# Patient Record
Sex: MALE | Race: WHITE | NOT HISPANIC OR LATINO | Employment: OTHER | URBAN - METROPOLITAN AREA
[De-identification: names, ages, dates, MRNs, and addresses within clinical notes are randomized per-mention and may not be internally consistent; named-entity substitution may affect disease eponyms.]

---

## 2023-09-18 ENCOUNTER — HOSPITAL ENCOUNTER (OUTPATIENT)
Dept: DATA CONVERSION | Facility: HOSPITAL | Age: 63
Discharge: HOME | End: 2023-09-18
Payer: MEDICARE

## 2023-09-18 DIAGNOSIS — Z01.89 ENCOUNTER FOR OTHER SPECIFIED SPECIAL EXAMINATIONS: ICD-10-CM

## 2023-09-18 LAB
ALBUMIN SERPL-MCNC: 4.5 GM/DL (ref 3.5–5)
ALBUMIN/GLOB SERPL: 2 RATIO (ref 1.5–3)
ALP BLD-CCNC: 67 U/L (ref 35–125)
ALT SERPL-CCNC: 43 U/L (ref 5–40)
ANION GAP SERPL CALCULATED.3IONS-SCNC: 15 MMOL/L (ref 0–19)
AST SERPL-CCNC: 40 U/L (ref 5–40)
BASOPHILS # BLD AUTO: 0.05 K/UL (ref 0–0.22)
BASOPHILS NFR BLD AUTO: 0.6 % (ref 0–1)
BILIRUB DIRECT SERPL-MCNC: 0.2 MG/DL (ref 0–0.2)
BILIRUB INDIRECT SERPL-MCNC: 0 MG/DL (ref 0–0.8)
BILIRUB SERPL-MCNC: 0.2 MG/DL (ref 0.1–1.2)
BUN SERPL-MCNC: 10 MG/DL (ref 8–25)
BUN/CREAT SERPL: 11.1 RATIO (ref 8–21)
CALCIUM SERPL-MCNC: 9.4 MG/DL (ref 8.5–10.4)
CHLORIDE SERPL-SCNC: 106 MMOL/L (ref 97–107)
CO2 SERPL-SCNC: 23 MMOL/L (ref 24–31)
CREAT SERPL-MCNC: 0.9 MG/DL (ref 0.4–1.6)
DEPRECATED RDW RBC AUTO: 43.7 FL (ref 37–54)
DIFFERENTIAL METHOD BLD: ABNORMAL
EOSINOPHIL # BLD AUTO: 0.06 K/UL (ref 0–0.45)
EOSINOPHIL NFR BLD: 0.7 % (ref 0–3)
ERYTHROCYTE [DISTWIDTH] IN BLOOD BY AUTOMATED COUNT: 12.7 % (ref 11.7–15)
GFR SERPL CREATININE-BSD FRML MDRD: 96 ML/MIN/1.73 M2
GLOBULIN SER-MCNC: 2.2 G/DL (ref 1.9–3.7)
GLUCOSE SERPL-MCNC: 101 MG/DL (ref 65–99)
HCT VFR BLD AUTO: 42.3 % (ref 41–50)
HGB BLD-MCNC: 14.4 GM/DL (ref 13.5–16.5)
IMM GRANULOCYTES # BLD AUTO: 0.02 K/UL (ref 0–0.1)
LYMPHOCYTES # BLD AUTO: 2.51 K/UL (ref 1.2–3.2)
LYMPHOCYTES NFR BLD MANUAL: 29.2 % (ref 20–40)
MCH RBC QN AUTO: 31.9 PG (ref 26–34)
MCHC RBC AUTO-ENTMCNC: 34 % (ref 31–37)
MCV RBC AUTO: 93.8 FL (ref 80–100)
MONOCYTES # BLD AUTO: 0.74 K/UL (ref 0–0.8)
MONOCYTES NFR BLD MANUAL: 8.6 % (ref 0–8)
NEUTROPHILS # BLD AUTO: 5.21 K/UL
NEUTROPHILS # BLD AUTO: 5.21 K/UL (ref 1.8–7.7)
NEUTROPHILS.IMMATURE NFR BLD: 0.2 % (ref 0–1)
NEUTS SEG NFR BLD: 60.7 % (ref 50–70)
NRBC BLD-RTO: 0 /100 WBC
PLATELET # BLD AUTO: 254 K/UL (ref 150–450)
PMV BLD AUTO: 10.4 CU (ref 7–12.6)
POTASSIUM SERPL-SCNC: 4.3 MMOL/L (ref 3.4–5.1)
PROT SERPL-MCNC: 6.7 G/DL (ref 5.9–7.9)
RBC # BLD AUTO: 4.51 M/UL (ref 4.5–5.5)
SODIUM SERPL-SCNC: 144 MMOL/L (ref 133–145)
WBC # BLD AUTO: 8.6 K/UL (ref 4.5–11)

## 2024-02-16 ENCOUNTER — TELEPHONE (OUTPATIENT)
Dept: PRIMARY CARE | Facility: CLINIC | Age: 64
End: 2024-02-16
Payer: MEDICARE

## 2024-02-16 DIAGNOSIS — Z87.39 HISTORY OF GOUT: Primary | ICD-10-CM

## 2024-02-17 PROBLEM — K57.90 DIVERTICULOSIS: Status: ACTIVE | Noted: 2024-02-17

## 2024-02-17 PROBLEM — Z87.39 HISTORY OF GOUT: Status: ACTIVE | Noted: 2024-02-17

## 2024-02-17 PROBLEM — M15.0 PRIMARY OSTEOARTHRITIS INVOLVING MULTIPLE JOINTS: Status: ACTIVE | Noted: 2024-02-17

## 2024-02-17 PROBLEM — I49.1 PAC (PREMATURE ATRIAL CONTRACTION): Status: ACTIVE | Noted: 2024-02-17

## 2024-02-17 PROBLEM — I10 ESSENTIAL HYPERTENSION: Status: ACTIVE | Noted: 2024-02-17

## 2024-02-17 PROBLEM — F10.10 ALCOHOL ABUSE: Status: ACTIVE | Noted: 2024-02-17

## 2024-02-17 PROBLEM — M15.9 PRIMARY OSTEOARTHRITIS INVOLVING MULTIPLE JOINTS: Status: ACTIVE | Noted: 2024-02-17

## 2024-02-17 PROBLEM — E78.2 MIXED HYPERLIPIDEMIA: Status: ACTIVE | Noted: 2024-02-17

## 2024-02-17 RX ORDER — METHYLPREDNISOLONE 4 MG/1
TABLET ORAL
Qty: 21 TABLET | Refills: 0 | Status: SHIPPED | OUTPATIENT
Start: 2024-02-17 | End: 2024-02-24

## 2024-02-17 NOTE — TELEPHONE ENCOUNTER
Prescription sent. Please  the patient that, assuming it is gout, this would be secondary to his alcohol intake and he should consider cutting back.

## 2024-03-20 ENCOUNTER — LAB (OUTPATIENT)
Dept: LAB | Facility: LAB | Age: 64
End: 2024-03-20
Payer: MEDICARE

## 2024-03-20 DIAGNOSIS — E55.9 VITAMIN D DEFICIENCY, UNSPECIFIED: ICD-10-CM

## 2024-03-20 DIAGNOSIS — Z87.39 PERSONAL HISTORY OF OTHER DISEASES OF THE MUSCULOSKELETAL SYSTEM AND CONNECTIVE TISSUE: ICD-10-CM

## 2024-03-20 DIAGNOSIS — Z12.5 ENCOUNTER FOR SCREENING FOR MALIGNANT NEOPLASM OF PROSTATE: ICD-10-CM

## 2024-03-20 DIAGNOSIS — E78.2 MIXED HYPERLIPIDEMIA: ICD-10-CM

## 2024-03-20 DIAGNOSIS — Z01.89 ENCOUNTER FOR OTHER SPECIFIED SPECIAL EXAMINATIONS: Primary | ICD-10-CM

## 2024-03-20 LAB
25(OH)D3 SERPL-MCNC: 28 NG/ML (ref 31–100)
ALBUMIN SERPL-MCNC: 4.4 G/DL (ref 3.5–5)
ALP BLD-CCNC: 66 U/L (ref 35–125)
ALT SERPL-CCNC: 35 U/L (ref 5–40)
ANION GAP SERPL CALC-SCNC: 12 MMOL/L
AST SERPL-CCNC: 25 U/L (ref 5–40)
BASOPHILS # BLD AUTO: 0.05 X10*3/UL (ref 0–0.1)
BASOPHILS NFR BLD AUTO: 0.7 %
BILIRUB DIRECT SERPL-MCNC: <0.2 MG/DL (ref 0–0.2)
BILIRUB SERPL-MCNC: 0.4 MG/DL (ref 0.1–1.2)
BUN SERPL-MCNC: 7 MG/DL (ref 8–25)
CALCIUM SERPL-MCNC: 9.1 MG/DL (ref 8.5–10.4)
CHLORIDE SERPL-SCNC: 105 MMOL/L (ref 97–107)
CHOLEST SERPL-MCNC: 243 MG/DL (ref 133–200)
CHOLEST/HDLC SERPL: 3.2 {RATIO}
CO2 SERPL-SCNC: 24 MMOL/L (ref 24–31)
CREAT SERPL-MCNC: 0.8 MG/DL (ref 0.4–1.6)
EGFRCR SERPLBLD CKD-EPI 2021: >90 ML/MIN/1.73M*2
EOSINOPHIL # BLD AUTO: 0.21 X10*3/UL (ref 0–0.7)
EOSINOPHIL NFR BLD AUTO: 2.9 %
ERYTHROCYTE [DISTWIDTH] IN BLOOD BY AUTOMATED COUNT: 13.2 % (ref 11.5–14.5)
EST. AVERAGE GLUCOSE BLD GHB EST-MCNC: 111 MG/DL
GLUCOSE SERPL-MCNC: 110 MG/DL (ref 65–99)
HBA1C MFR BLD: 5.5 %
HCT VFR BLD AUTO: 43.8 % (ref 41–52)
HDLC SERPL-MCNC: 77 MG/DL
HGB BLD-MCNC: 14.7 G/DL (ref 13.5–17.5)
IMM GRANULOCYTES # BLD AUTO: 0.02 X10*3/UL (ref 0–0.7)
IMM GRANULOCYTES NFR BLD AUTO: 0.3 % (ref 0–0.9)
LDLC SERPL CALC-MCNC: 135 MG/DL (ref 65–130)
LYMPHOCYTES # BLD AUTO: 2.8 X10*3/UL (ref 1.2–4.8)
LYMPHOCYTES NFR BLD AUTO: 38.9 %
MCH RBC QN AUTO: 31.8 PG (ref 26–34)
MCHC RBC AUTO-ENTMCNC: 33.6 G/DL (ref 32–36)
MCV RBC AUTO: 95 FL (ref 80–100)
MONOCYTES # BLD AUTO: 0.56 X10*3/UL (ref 0.1–1)
MONOCYTES NFR BLD AUTO: 7.8 %
NEUTROPHILS # BLD AUTO: 3.55 X10*3/UL (ref 1.2–7.7)
NEUTROPHILS NFR BLD AUTO: 49.4 %
NRBC BLD-RTO: 0 /100 WBCS (ref 0–0)
PLATELET # BLD AUTO: 239 X10*3/UL (ref 150–450)
POTASSIUM SERPL-SCNC: 4.4 MMOL/L (ref 3.4–5.1)
PROT SERPL-MCNC: 6.5 G/DL (ref 5.9–7.9)
PSA SERPL-MCNC: 0.4 NG/ML
RBC # BLD AUTO: 4.62 X10*6/UL (ref 4.5–5.9)
SODIUM SERPL-SCNC: 141 MMOL/L (ref 133–145)
TRIGL SERPL-MCNC: 154 MG/DL (ref 40–150)
URATE SERPL-MCNC: 7.5 MG/DL (ref 3.6–7.7)
WBC # BLD AUTO: 7.2 X10*3/UL (ref 4.4–11.3)

## 2024-03-20 PROCEDURE — 82248 BILIRUBIN DIRECT: CPT

## 2024-03-20 PROCEDURE — 80053 COMPREHEN METABOLIC PANEL: CPT

## 2024-03-20 PROCEDURE — 80061 LIPID PANEL: CPT

## 2024-03-20 PROCEDURE — 84153 ASSAY OF PSA TOTAL: CPT

## 2024-03-20 PROCEDURE — 84550 ASSAY OF BLOOD/URIC ACID: CPT

## 2024-03-20 PROCEDURE — 82306 VITAMIN D 25 HYDROXY: CPT

## 2024-03-20 PROCEDURE — 83036 HEMOGLOBIN GLYCOSYLATED A1C: CPT

## 2024-03-20 PROCEDURE — 36415 COLL VENOUS BLD VENIPUNCTURE: CPT

## 2024-03-20 PROCEDURE — 85025 COMPLETE CBC W/AUTO DIFF WBC: CPT

## 2024-03-21 PROBLEM — M54.16 LUMBAR RADICULOPATHY: Status: ACTIVE | Noted: 2024-03-21

## 2024-03-21 PROBLEM — E55.9 VITAMIN D DEFICIENCY, UNSPECIFIED: Status: ACTIVE | Noted: 2023-03-20

## 2024-03-21 PROBLEM — Z12.5 ENCOUNTER FOR SCREENING FOR MALIGNANT NEOPLASM OF PROSTATE: Status: RESOLVED | Noted: 2023-03-20 | Resolved: 2024-03-21

## 2024-03-21 PROBLEM — M15.9 GENERALIZED OSTEOARTHRITIS: Status: ACTIVE | Noted: 2024-03-21

## 2024-03-21 PROBLEM — R78.2: Status: ACTIVE | Noted: 2023-03-20

## 2024-03-21 PROBLEM — Z87.19 PERSONAL HISTORY OF OTHER DISEASES OF THE DIGESTIVE SYSTEM: Status: RESOLVED | Noted: 2024-03-21 | Resolved: 2024-03-21

## 2024-03-21 PROBLEM — Z87.39 PERSONAL HISTORY OF OTHER DISEASES OF THE MUSCULOSKELETAL SYSTEM AND CONNECTIVE TISSUE: Status: RESOLVED | Noted: 2023-03-20 | Resolved: 2024-03-21

## 2024-03-21 PROBLEM — R73.9 HYPERGLYCEMIA, UNSPECIFIED: Status: ACTIVE | Noted: 2023-03-20

## 2024-03-21 PROBLEM — N52.9 MALE ERECTILE DYSFUNCTION, UNSPECIFIED: Status: ACTIVE | Noted: 2024-03-21

## 2024-03-21 PROBLEM — E78.1 HYPERTRIGLYCERIDEMIA: Status: ACTIVE | Noted: 2024-03-21

## 2024-03-21 PROBLEM — E03.9 HYPOTHYROIDISM, UNSPECIFIED: Status: ACTIVE | Noted: 2023-03-20

## 2024-03-21 RX ORDER — BENAZEPRIL HYDROCHLORIDE 40 MG/1
TABLET ORAL EVERY 24 HOURS
COMMUNITY
End: 2024-03-26 | Stop reason: SDUPTHER

## 2024-03-21 RX ORDER — AMLODIPINE BESYLATE 10 MG/1
TABLET ORAL EVERY 24 HOURS
COMMUNITY
End: 2024-03-26 | Stop reason: SDUPTHER

## 2024-03-21 RX ORDER — DEXTROMETHORPHAN HYDROBROMIDE, GUAIFENESIN 5; 100 MG/5ML; MG/5ML
LIQUID ORAL EVERY 8 HOURS
COMMUNITY
End: 2024-03-26 | Stop reason: ALTCHOICE

## 2024-03-26 ENCOUNTER — OFFICE VISIT (OUTPATIENT)
Dept: PRIMARY CARE | Facility: CLINIC | Age: 64
End: 2024-03-26
Payer: MEDICARE

## 2024-03-26 VITALS
HEIGHT: 69 IN | BODY MASS INDEX: 28.58 KG/M2 | OXYGEN SATURATION: 97 % | TEMPERATURE: 97.9 F | SYSTOLIC BLOOD PRESSURE: 122 MMHG | WEIGHT: 193 LBS | DIASTOLIC BLOOD PRESSURE: 82 MMHG | HEART RATE: 83 BPM

## 2024-03-26 DIAGNOSIS — E55.9 VITAMIN D DEFICIENCY: ICD-10-CM

## 2024-03-26 DIAGNOSIS — Z87.39 HISTORY OF GOUT: ICD-10-CM

## 2024-03-26 DIAGNOSIS — Z01.89 ENCOUNTER FOR ROUTINE LABORATORY TESTING: ICD-10-CM

## 2024-03-26 DIAGNOSIS — E78.2 MIXED HYPERLIPIDEMIA: ICD-10-CM

## 2024-03-26 DIAGNOSIS — M15.9 PRIMARY OSTEOARTHRITIS INVOLVING MULTIPLE JOINTS: Primary | ICD-10-CM

## 2024-03-26 DIAGNOSIS — I10 PRIMARY HYPERTENSION: ICD-10-CM

## 2024-03-26 PROBLEM — M19.90 OA (OSTEOARTHRITIS): Status: ACTIVE | Noted: 2024-02-17

## 2024-03-26 PROBLEM — E78.1 HYPERTRIGLYCERIDEMIA: Status: RESOLVED | Noted: 2024-03-21 | Resolved: 2024-03-26

## 2024-03-26 PROBLEM — E78.5 HLD (HYPERLIPIDEMIA): Status: ACTIVE | Noted: 2024-02-17

## 2024-03-26 PROCEDURE — 1036F TOBACCO NON-USER: CPT | Performed by: STUDENT IN AN ORGANIZED HEALTH CARE EDUCATION/TRAINING PROGRAM

## 2024-03-26 PROCEDURE — 3079F DIAST BP 80-89 MM HG: CPT | Performed by: STUDENT IN AN ORGANIZED HEALTH CARE EDUCATION/TRAINING PROGRAM

## 2024-03-26 PROCEDURE — 99214 OFFICE O/P EST MOD 30 MIN: CPT | Performed by: STUDENT IN AN ORGANIZED HEALTH CARE EDUCATION/TRAINING PROGRAM

## 2024-03-26 PROCEDURE — 3074F SYST BP LT 130 MM HG: CPT | Performed by: STUDENT IN AN ORGANIZED HEALTH CARE EDUCATION/TRAINING PROGRAM

## 2024-03-26 RX ORDER — ACETAMINOPHEN 500 MG
2000 TABLET ORAL DAILY
Start: 2024-03-26

## 2024-03-26 RX ORDER — AMLODIPINE BESYLATE 10 MG/1
10 TABLET ORAL DAILY
Qty: 90 TABLET | Refills: 3 | Status: SHIPPED | OUTPATIENT
Start: 2024-03-26 | End: 2025-03-26

## 2024-03-26 RX ORDER — BENAZEPRIL HYDROCHLORIDE 40 MG/1
40 TABLET ORAL DAILY
Qty: 90 TABLET | Refills: 3 | Status: SHIPPED | OUTPATIENT
Start: 2024-03-26 | End: 2025-03-26

## 2024-03-26 RX ORDER — ALLOPURINOL 100 MG/1
100 TABLET ORAL DAILY
Qty: 90 TABLET | Refills: 3 | Status: SHIPPED | OUTPATIENT
Start: 2024-03-26 | End: 2025-03-26

## 2024-03-26 RX ORDER — MULTIVITAMIN
1 TABLET ORAL DAILY
Start: 2024-03-26

## 2024-03-26 ASSESSMENT — PATIENT HEALTH QUESTIONNAIRE - PHQ9
1. LITTLE INTEREST OR PLEASURE IN DOING THINGS: NOT AT ALL
2. FEELING DOWN, DEPRESSED OR HOPELESS: NOT AT ALL
SUM OF ALL RESPONSES TO PHQ9 QUESTIONS 1 AND 2: 0

## 2024-03-26 ASSESSMENT — ENCOUNTER SYMPTOMS
CARDIOVASCULAR NEGATIVE: 1
RESPIRATORY NEGATIVE: 1
GASTROINTESTINAL NEGATIVE: 1
CONSTITUTIONAL NEGATIVE: 1

## 2024-03-26 ASSESSMENT — PAIN SCALES - GENERAL: PAINLEVEL: 0-NO PAIN

## 2024-03-26 NOTE — PROGRESS NOTES
[facility]: [facility]  PROGRESS NOTE      Austen Eagle is a 63 y.o. male that is presenting today for Follow-up.    Assessment/Plan   Diagnoses and all orders for this visit:  Primary osteoarthritis involving multiple joints  Mixed hyperlipidemia  -     Hepatic Function Panel; Future  Primary hypertension  -     amLODIPine (Norvasc) 10 mg tablet; Take 1 tablet (10 mg) by mouth once daily.  -     benazepril (Lotensin) 40 mg tablet; Take 1 tablet (40 mg) by mouth once daily.  -     CBC and Auto Differential; Future  -     Basic Metabolic Panel; Future  Vitamin D deficiency  -     multivitamin tablet; Take 1 tablet by mouth once daily.  -     cholecalciferol (Vitamin D3) 50 mcg (2,000 unit) capsule; Take 1 capsule (50 mcg) by mouth once daily.  -     Vitamin D 25-Hydroxy,Total (for eval of Vitamin D levels); Future  History of gout  -     multivitamin tablet; Take 1 tablet by mouth once daily.  -     allopurinol (Zyloprim) 100 mg tablet; Take 1 tablet (100 mg) by mouth once daily.  -     Uric Acid; Future  Encounter for routine laboratory testing  -     CBC and Auto Differential; Future  -     Basic Metabolic Panel; Future  -     Hepatic Function Panel; Future  -     Uric Acid; Future  -     Vitamin D 25-Hydroxy,Total (for eval of Vitamin D levels); Future  -     Follow Up In Primary Care; Future    - Significant medication and problem list reconciliation done today.  - Patient notes that he had a recent gout flare. Discussed with him modifiable risk factors that can play a role. Patient voiced understanding. Uric acid higher than target on labs. Start low-dose allopurinol. Recheck in six months.  - Patient's vitals look good. Do not need to make any changes at this time.  - Patient had labwork done for this appointment. Discussed today, outside of the uric acid being somewhat elevated, labwork fairly unremarkable. Will order labwork for the patient's next appointment.  - Encouraged continued  diet and exercise modification.    Subjective   - The patient otherwise feels well and denies any acute symptoms or concerns at this time.  - The patient denies any changes or progression of their chronic medical problems.  - The patient denies any problems or concerns with their medications.      Review of Systems   Constitutional: Negative.    Respiratory: Negative.     Cardiovascular: Negative.    Gastrointestinal: Negative.    All other systems reviewed and are negative.     Objective   Vitals:    03/26/24 1045   BP: 122/82   Pulse: 83   Temp: 36.6 °C (97.9 °F)   SpO2: 97%      Body mass index is 28.5 kg/m².  Physical Exam  Vitals and nursing note reviewed.   Constitutional:       General: He is not in acute distress.  Neck:      Vascular: No carotid bruit.   Cardiovascular:      Rate and Rhythm: Normal rate and regular rhythm.      Heart sounds: Normal heart sounds.   Pulmonary:      Effort: Pulmonary effort is normal.      Breath sounds: Normal breath sounds.   Musculoskeletal:         General: No swelling.   Neurological:      Mental Status: He is alert. Mental status is at baseline.   Psychiatric:         Mood and Affect: Mood normal.       Diagnostic Results   Lab Results   Component Value Date    GLUCOSE 110 (H) 03/20/2024    CALCIUM 9.1 03/20/2024     03/20/2024    K 4.4 03/20/2024    CO2 24 03/20/2024     03/20/2024    BUN 7 (L) 03/20/2024    CREATININE 0.80 03/20/2024     Lab Results   Component Value Date    ALT 35 03/20/2024    AST 25 03/20/2024    ALKPHOS 66 03/20/2024    BILITOT 0.4 03/20/2024     Lab Results   Component Value Date    WBC 7.2 03/20/2024    HGB 14.7 03/20/2024    HCT 43.8 03/20/2024    MCV 95 03/20/2024     03/20/2024     Lab Results   Component Value Date    CHOL 243 (H) 03/20/2024    CHOL 223 (H) 03/20/2023    CHOL 259 (H) 02/22/2021     Lab Results   Component Value Date    HDL 77.0 03/20/2024    HDL 77 03/20/2023    HDL 84 02/22/2021     Lab Results   Component  "Value Date    LDLCALC 135 (H) 03/20/2024    LDLCALC 123 03/20/2023    LDLCALC 125 02/22/2021     Lab Results   Component Value Date    TRIG 154 (H) 03/20/2024    TRIG 114 03/20/2023    TRIG 249 (H) 02/22/2021     No components found for: \"CHOLHDL\"  Lab Results   Component Value Date    HGBA1C 5.5 03/20/2024     Other labs not included in the list above were reviewed either before or during this encounter.    History    Past Medical History:   Diagnosis Date    Personal history of other diseases of the digestive system 03/21/2024    Personal history of other diseases of the musculoskeletal system and connective tissue 03/20/2023     History reviewed. No pertinent surgical history.  No family history on file.  Social History     Socioeconomic History    Marital status:      Spouse name: Not on file    Number of children: Not on file    Years of education: Not on file    Highest education level: Not on file   Occupational History    Not on file   Tobacco Use    Smoking status: Former     Types: Cigarettes    Smokeless tobacco: Never   Vaping Use    Vaping Use: Never used   Substance and Sexual Activity    Alcohol use: Yes     Alcohol/week: 24.0 standard drinks of alcohol     Types: 24 Standard drinks or equivalent per week    Drug use: Yes     Types: Marijuana     Comment: sometimes    Sexual activity: Not on file   Other Topics Concern    Not on file   Social History Narrative    Not on file     Social Determinants of Health     Financial Resource Strain: Not on file   Food Insecurity: Not on file   Transportation Needs: Not on file   Physical Activity: Not on file   Stress: Not on file   Social Connections: Not on file   Intimate Partner Violence: Not on file   Housing Stability: Not on file     Allergies   Allergen Reactions    Hay Fever And Allergy Relief Other    Ibuprofen Unknown    Pollen Extracts Other    Aspirin Rash     Current Outpatient Medications on File Prior to Visit   Medication Sig Dispense " Refill    psyllium (Metamucil) 3.4 gram packet once every 24 hours.      vit A/vit C/vit E/zinc/copper (ICAPS AREDS ORAL) once every 24 hours.      [DISCONTINUED] acetaminophen (Tylenol 8 HOUR) 650 mg ER tablet Take by mouth every 8 hours.      [DISCONTINUED] amLODIPine (Norvasc) 10 mg tablet Take by mouth once every 24 hours.      [DISCONTINUED] benazepril (Lotensin) 40 mg tablet Take by mouth once every 24 hours.      [DISCONTINUED] MULTIVITAMIN ORAL as directed Orally      [DISCONTINUED] NON FORMULARY Gout OTC       No current facility-administered medications on file prior to visit.       There is no immunization history on file for this patient.  Patient's medical history was reviewed and updated either before or during this encounter.       Dorian Mckeon MD

## 2024-03-26 NOTE — PATIENT INSTRUCTIONS
Diagnoses and all orders for this visit:  Primary osteoarthritis involving multiple joints  Mixed hyperlipidemia  -     Hepatic Function Panel; Future  Primary hypertension  -     amLODIPine (Norvasc) 10 mg tablet; Take 1 tablet (10 mg) by mouth once daily.  -     benazepril (Lotensin) 40 mg tablet; Take 1 tablet (40 mg) by mouth once daily.  -     CBC and Auto Differential; Future  -     Basic Metabolic Panel; Future  Vitamin D deficiency  -     multivitamin tablet; Take 1 tablet by mouth once daily.  -     cholecalciferol (Vitamin D3) 50 mcg (2,000 unit) capsule; Take 1 capsule (50 mcg) by mouth once daily.  -     Vitamin D 25-Hydroxy,Total (for eval of Vitamin D levels); Future  History of gout  -     multivitamin tablet; Take 1 tablet by mouth once daily.  -     allopurinol (Zyloprim) 100 mg tablet; Take 1 tablet (100 mg) by mouth once daily.  -     Uric Acid; Future  Encounter for routine laboratory testing  -     CBC and Auto Differential; Future  -     Basic Metabolic Panel; Future  -     Hepatic Function Panel; Future  -     Uric Acid; Future  -     Vitamin D 25-Hydroxy,Total (for eval of Vitamin D levels); Future  -     Follow Up In Primary Care; Future     - Significant medication and problem list reconciliation done today.  - Patient notes that he had a recent gout flare. Discussed with him modifiable risk factors that can play a role. Patient voiced understanding. Uric acid higher than target on labs. Start low-dose allopurinol. Recheck in six months.  - Patient's vitals look good. Do not need to make any changes at this time.  - Patient had labwork done for this appointment. Discussed today, outside of the uric acid being somewhat elevated, labwork fairly unremarkable. Will order labwork for the patient's next appointment.  - Encouraged continued diet and exercise modification.

## 2024-04-10 ENCOUNTER — HOSPITAL ENCOUNTER (OUTPATIENT)
Dept: RADIOLOGY | Facility: EXTERNAL LOCATION | Age: 64
Discharge: HOME | End: 2024-04-10

## 2024-04-10 DIAGNOSIS — M25.521 RIGHT ELBOW PAIN: ICD-10-CM

## 2024-04-11 ENCOUNTER — OFFICE VISIT (OUTPATIENT)
Dept: ORTHOPEDIC SURGERY | Facility: CLINIC | Age: 64
End: 2024-04-11
Payer: MEDICARE

## 2024-04-11 DIAGNOSIS — S50.11XA CONTUSION OF ELBOW AND FOREARM, RIGHT, INITIAL ENCOUNTER: Primary | ICD-10-CM

## 2024-04-11 PROCEDURE — 1036F TOBACCO NON-USER: CPT | Performed by: ORTHOPAEDIC SURGERY

## 2024-04-11 PROCEDURE — 99213 OFFICE O/P EST LOW 20 MIN: CPT | Performed by: ORTHOPAEDIC SURGERY

## 2024-04-11 PROCEDURE — 99203 OFFICE O/P NEW LOW 30 MIN: CPT | Performed by: ORTHOPAEDIC SURGERY

## 2024-04-11 RX ORDER — TRAMADOL HYDROCHLORIDE 50 MG/1
50 TABLET ORAL EVERY 8 HOURS PRN
Qty: 6 TABLET | Refills: 0 | Status: SHIPPED | OUTPATIENT
Start: 2024-04-11 | End: 2024-04-16

## 2024-04-11 ASSESSMENT — PATIENT HEALTH QUESTIONNAIRE - PHQ9
SUM OF ALL RESPONSES TO PHQ9 QUESTIONS 1 AND 2: 0
1. LITTLE INTEREST OR PLEASURE IN DOING THINGS: NOT AT ALL
2. FEELING DOWN, DEPRESSED OR HOPELESS: NOT AT ALL

## 2024-04-11 ASSESSMENT — PAIN SCALES - GENERAL: PAINLEVEL_OUTOF10: 10 - WORST POSSIBLE PAIN

## 2024-04-11 ASSESSMENT — ENCOUNTER SYMPTOMS
LOSS OF SENSATION IN FEET: 0
OCCASIONAL FEELINGS OF UNSTEADINESS: 0
DEPRESSION: 0

## 2024-04-11 ASSESSMENT — PAIN - FUNCTIONAL ASSESSMENT: PAIN_FUNCTIONAL_ASSESSMENT: 0-10

## 2024-04-11 NOTE — PROGRESS NOTES
History of Present Illness:  Chief Complaint   Patient presents with    Right Elbow - Injury       Patient presents today for evaluation of right elbow injury that occurred a couple days ago when he fell off a ladder from approximately 8 feet up.  He fell and sustained multiple bruises, but right elbow has been most symptomatic.  He was seen in urgent care where radiographs were obtained and there was some concern for possible occult elbow injury.  Placed into sling and referred for further treatment.  Patient reports continued aching/throbbing pains into his elbow.  He does also have aching pains into other areas of bruising, but no specific activity limitations with other areas of his body.    Past Medical History:   Diagnosis Date    Personal history of other diseases of the digestive system 03/21/2024    Personal history of other diseases of the musculoskeletal system and connective tissue 03/20/2023       Medication Documentation Review Audit       Reviewed by Deon Interiano MD (Physician) on 04/11/24 at 0904      Medication Order Taking? Sig Documenting Provider Last Dose Status   allopurinol (Zyloprim) 100 mg tablet [id number]  Take 1 tablet (100 mg) by mouth once daily. Dorian Mckeon MD  Active   amLODIPine (Norvasc) 10 mg tablet [id number]  Take 1 tablet (10 mg) by mouth once daily. Dorian Mckeon MD  Active   benazepril (Lotensin) 40 mg tablet [id number]  Take 1 tablet (40 mg) by mouth once daily. Dorian Mckeon MD  Active   cholecalciferol (Vitamin D3) 50 mcg (2,000 unit) capsule [id number]  Take 1 capsule (50 mcg) by mouth once daily. Dorian Mckeon MD  Active   multivitamin tablet [id number]  Take 1 tablet by mouth once daily. Dorian Mckeon MD  Active   psyllium (Metamucil) 3.4 gram packet [id number] No once every 24 hours. Historical Provider, MD Taking Active   vit A/vit C/vit E/zinc/copper (ICAPS AREDS ORAL) [id number] No once every 24 hours. Historical Provider, MD Taking Active                     Allergies   Allergen Reactions    Hay Fever And Allergy Relief Other    Ibuprofen Unknown    Pollen Extracts Other    Aspirin Rash       Social History     Socioeconomic History    Marital status:      Spouse name: Not on file    Number of children: Not on file    Years of education: Not on file    Highest education level: Not on file   Occupational History    Not on file   Tobacco Use    Smoking status: Former     Types: Cigarettes    Smokeless tobacco: Never   Vaping Use    Vaping status: Never Used   Substance and Sexual Activity    Alcohol use: Yes     Alcohol/week: 24.0 standard drinks of alcohol     Types: 24 Standard drinks or equivalent per week    Drug use: Yes     Types: Marijuana     Comment: sometimes    Sexual activity: Not on file   Other Topics Concern    Not on file   Social History Narrative    Not on file     Social Determinants of Health     Financial Resource Strain: Not on file   Food Insecurity: Not on file   Transportation Needs: Not on file   Physical Activity: Not on file   Stress: Not on file   Social Connections: Not on file   Intimate Partner Violence: Not on file   Housing Stability: Not on file       History reviewed. No pertinent surgical history.     Review of Systems   GENERAL: Negative for malaise, significant weight loss, fever  MUSCULOSKELETAL: see HPI  NEURO:  Negative     Physical Examination  Constitutional: Appears well-developed and well-nourished.  Head: Normocephalic and atraumatic.  Eyes: EOMI grossly  Cardiovascular: Intact distal pulses.   Respiratory: Effort normal. No respiratory distress.  Neurologic: Alert and oriented to person, place, and time.  Skin: Skin is warm and dry.  Hematologic / Lymphatic: No lymphedema, lymphangitis.  Psychiatric: normal mood and affect. Behavior is normal.   Musculoskeletal:  Right arm: Abrasion about posterior elbow with no signs of infection.  Elbow with  degrees flexion.  60/60 degrees pronation/supination.   Sensation intact distally.  Tenderness about lateral and posterior elbow.  Elbow flexion and extension intact.  Capillary refill less than 2 seconds distally.    Radiographs: Right elbow radiographs available for my review/interpretation: No definite fracture appreciated.  No posterior fat pad.  Mild degenerative changes about elbow.     Assessment:  Patient with right elbow contusion and potential occult injury.     Plan:  We discussed potential nature of the diagnosis with patient and his wife.  At this time recommend continued protection with a sling and okay for active mobilization.  Follow-up in 2 weeks with repeat right elbow radiographs for reassessment.  Patient is unable to take anti-inflammatories due to allergy.  Because of this a small prescription for tramadol was sent to his pharmacy.

## 2024-04-25 ENCOUNTER — APPOINTMENT (OUTPATIENT)
Dept: ORTHOPEDIC SURGERY | Facility: CLINIC | Age: 64
End: 2024-04-25
Payer: MEDICARE

## 2024-09-27 ENCOUNTER — LAB (OUTPATIENT)
Dept: LAB | Facility: LAB | Age: 64
End: 2024-09-27
Payer: MEDICARE

## 2024-09-27 DIAGNOSIS — E78.2 MIXED HYPERLIPIDEMIA: ICD-10-CM

## 2024-09-27 DIAGNOSIS — E55.9 VITAMIN D DEFICIENCY: ICD-10-CM

## 2024-09-27 DIAGNOSIS — Z01.89 ENCOUNTER FOR ROUTINE LABORATORY TESTING: ICD-10-CM

## 2024-09-27 DIAGNOSIS — I10 PRIMARY HYPERTENSION: ICD-10-CM

## 2024-09-27 DIAGNOSIS — Z87.39 HISTORY OF GOUT: ICD-10-CM

## 2024-09-27 LAB
25(OH)D3 SERPL-MCNC: 27 NG/ML (ref 30–100)
ALBUMIN SERPL BCP-MCNC: 4.4 G/DL (ref 3.4–5)
ALP SERPL-CCNC: 58 U/L (ref 33–136)
ALT SERPL W P-5'-P-CCNC: 47 U/L (ref 10–52)
ANION GAP SERPL CALCULATED.3IONS-SCNC: 12 MMOL/L (ref 10–20)
AST SERPL W P-5'-P-CCNC: 37 U/L (ref 9–39)
BASOPHILS # BLD AUTO: 0.06 X10*3/UL (ref 0–0.1)
BASOPHILS NFR BLD AUTO: 0.7 %
BILIRUB DIRECT SERPL-MCNC: 0.1 MG/DL (ref 0–0.3)
BILIRUB SERPL-MCNC: 0.5 MG/DL (ref 0–1.2)
BUN SERPL-MCNC: 10 MG/DL (ref 6–23)
CALCIUM SERPL-MCNC: 8.8 MG/DL (ref 8.6–10.3)
CHLORIDE SERPL-SCNC: 107 MMOL/L (ref 98–107)
CO2 SERPL-SCNC: 24 MMOL/L (ref 21–32)
CREAT SERPL-MCNC: 0.73 MG/DL (ref 0.5–1.3)
EGFRCR SERPLBLD CKD-EPI 2021: >90 ML/MIN/1.73M*2
EOSINOPHIL # BLD AUTO: 0.4 X10*3/UL (ref 0–0.7)
EOSINOPHIL NFR BLD AUTO: 4.8 %
ERYTHROCYTE [DISTWIDTH] IN BLOOD BY AUTOMATED COUNT: 12.9 % (ref 11.5–14.5)
GLUCOSE SERPL-MCNC: 99 MG/DL (ref 74–99)
HCT VFR BLD AUTO: 44.1 % (ref 41–52)
HGB BLD-MCNC: 15.2 G/DL (ref 13.5–17.5)
IMM GRANULOCYTES # BLD AUTO: 0.04 X10*3/UL (ref 0–0.7)
IMM GRANULOCYTES NFR BLD AUTO: 0.5 % (ref 0–0.9)
LYMPHOCYTES # BLD AUTO: 3.1 X10*3/UL (ref 1.2–4.8)
LYMPHOCYTES NFR BLD AUTO: 37.3 %
MCH RBC QN AUTO: 32.3 PG (ref 26–34)
MCHC RBC AUTO-ENTMCNC: 34.5 G/DL (ref 32–36)
MCV RBC AUTO: 94 FL (ref 80–100)
MONOCYTES # BLD AUTO: 0.63 X10*3/UL (ref 0.1–1)
MONOCYTES NFR BLD AUTO: 7.6 %
NEUTROPHILS # BLD AUTO: 4.07 X10*3/UL (ref 1.2–7.7)
NEUTROPHILS NFR BLD AUTO: 49.1 %
NRBC BLD-RTO: 0 /100 WBCS (ref 0–0)
PLATELET # BLD AUTO: 248 X10*3/UL (ref 150–450)
POTASSIUM SERPL-SCNC: 4.3 MMOL/L (ref 3.5–5.3)
PROT SERPL-MCNC: 6.5 G/DL (ref 6.4–8.2)
RBC # BLD AUTO: 4.7 X10*6/UL (ref 4.5–5.9)
SODIUM SERPL-SCNC: 139 MMOL/L (ref 136–145)
URATE SERPL-MCNC: 6 MG/DL (ref 4–7.5)
WBC # BLD AUTO: 8.3 X10*3/UL (ref 4.4–11.3)

## 2024-09-27 PROCEDURE — 82248 BILIRUBIN DIRECT: CPT

## 2024-09-27 PROCEDURE — 85025 COMPLETE CBC W/AUTO DIFF WBC: CPT

## 2024-09-27 PROCEDURE — 80053 COMPREHEN METABOLIC PANEL: CPT

## 2024-09-27 PROCEDURE — 82306 VITAMIN D 25 HYDROXY: CPT

## 2024-09-27 PROCEDURE — 36415 COLL VENOUS BLD VENIPUNCTURE: CPT

## 2024-09-27 PROCEDURE — 84550 ASSAY OF BLOOD/URIC ACID: CPT

## 2024-10-08 ENCOUNTER — APPOINTMENT (OUTPATIENT)
Dept: PRIMARY CARE | Facility: CLINIC | Age: 64
End: 2024-10-08
Payer: MEDICARE

## 2024-11-07 ASSESSMENT — PROMIS GLOBAL HEALTH SCALE
RATE_PHYSICAL_HEALTH: GOOD
CARRYOUT_SOCIAL_ACTIVITIES: VERY GOOD
RATE_SOCIAL_SATISFACTION: VERY GOOD
RATE_GENERAL_HEALTH: GOOD
RATE_AVERAGE_PAIN: 6
RATE_QUALITY_OF_LIFE: VERY GOOD
RATE_MENTAL_HEALTH: VERY GOOD
CARRYOUT_PHYSICAL_ACTIVITIES: COMPLETELY
EMOTIONAL_PROBLEMS: SOMETIMES
RATE_AVERAGE_FATIGUE: MODERATE

## 2024-11-08 ENCOUNTER — HOSPITAL ENCOUNTER (OUTPATIENT)
Dept: RADIOLOGY | Facility: CLINIC | Age: 64
Discharge: HOME | End: 2024-11-08
Payer: MEDICARE

## 2024-11-08 ENCOUNTER — OFFICE VISIT (OUTPATIENT)
Dept: PRIMARY CARE | Facility: CLINIC | Age: 64
End: 2024-11-08
Payer: MEDICARE

## 2024-11-08 VITALS
BODY MASS INDEX: 26.04 KG/M2 | HEIGHT: 71 IN | TEMPERATURE: 97.3 F | HEART RATE: 89 BPM | OXYGEN SATURATION: 98 % | WEIGHT: 186 LBS | SYSTOLIC BLOOD PRESSURE: 130 MMHG | DIASTOLIC BLOOD PRESSURE: 88 MMHG

## 2024-11-08 DIAGNOSIS — Z12.11 ENCOUNTER FOR COLORECTAL CANCER SCREENING: ICD-10-CM

## 2024-11-08 DIAGNOSIS — I10 PRIMARY HYPERTENSION: ICD-10-CM

## 2024-11-08 DIAGNOSIS — M54.16 LUMBAR RADICULOPATHY: ICD-10-CM

## 2024-11-08 DIAGNOSIS — Z12.12 ENCOUNTER FOR COLORECTAL CANCER SCREENING: ICD-10-CM

## 2024-11-08 DIAGNOSIS — E78.2 MIXED HYPERLIPIDEMIA: ICD-10-CM

## 2024-11-08 DIAGNOSIS — Z13.6 ENCOUNTER FOR SCREENING FOR CARDIOVASCULAR DISORDERS: ICD-10-CM

## 2024-11-08 DIAGNOSIS — Z87.39 HISTORY OF GOUT: ICD-10-CM

## 2024-11-08 DIAGNOSIS — Z00.00 ANNUAL PHYSICAL EXAM: Primary | ICD-10-CM

## 2024-11-08 DIAGNOSIS — Z12.5 ENCOUNTER FOR PROSTATE CANCER SCREENING: ICD-10-CM

## 2024-11-08 DIAGNOSIS — R73.9 HYPERGLYCEMIA: ICD-10-CM

## 2024-11-08 DIAGNOSIS — M15.0 PRIMARY OSTEOARTHRITIS INVOLVING MULTIPLE JOINTS: ICD-10-CM

## 2024-11-08 DIAGNOSIS — Z01.89 ENCOUNTER FOR ROUTINE LABORATORY TESTING: ICD-10-CM

## 2024-11-08 DIAGNOSIS — Z23 ENCOUNTER FOR IMMUNIZATION: ICD-10-CM

## 2024-11-08 DIAGNOSIS — E55.9 VITAMIN D DEFICIENCY: ICD-10-CM

## 2024-11-08 PROCEDURE — 72050 X-RAY EXAM NECK SPINE 4/5VWS: CPT

## 2024-11-08 PROCEDURE — 90677 PCV20 VACCINE IM: CPT | Performed by: STUDENT IN AN ORGANIZED HEALTH CARE EDUCATION/TRAINING PROGRAM

## 2024-11-08 PROCEDURE — G0402 INITIAL PREVENTIVE EXAM: HCPCS | Performed by: STUDENT IN AN ORGANIZED HEALTH CARE EDUCATION/TRAINING PROGRAM

## 2024-11-08 PROCEDURE — 93005 ELECTROCARDIOGRAM TRACING: CPT | Performed by: STUDENT IN AN ORGANIZED HEALTH CARE EDUCATION/TRAINING PROGRAM

## 2024-11-08 PROCEDURE — 73130 X-RAY EXAM OF HAND: CPT | Mod: 50

## 2024-11-08 PROCEDURE — 99214 OFFICE O/P EST MOD 30 MIN: CPT | Mod: 25 | Performed by: STUDENT IN AN ORGANIZED HEALTH CARE EDUCATION/TRAINING PROGRAM

## 2024-11-08 RX ORDER — VIT C/E/ZN/COPPR/LUTEIN/ZEAXAN 250MG-90MG
5000 CAPSULE ORAL DAILY
Status: SHIPPED | COMMUNITY
Start: 2024-11-08

## 2024-11-08 ASSESSMENT — PATIENT HEALTH QUESTIONNAIRE - PHQ9
2. FEELING DOWN, DEPRESSED OR HOPELESS: NOT AT ALL
1. LITTLE INTEREST OR PLEASURE IN DOING THINGS: NOT AT ALL
SUM OF ALL RESPONSES TO PHQ9 QUESTIONS 1 AND 2: 0

## 2024-11-08 ASSESSMENT — ENCOUNTER SYMPTOMS
NEUROLOGICAL NEGATIVE: 1
GASTROINTESTINAL NEGATIVE: 1
EYES NEGATIVE: 1
CONSTITUTIONAL NEGATIVE: 1
ALLERGIC/IMMUNOLOGIC NEGATIVE: 1
RESPIRATORY NEGATIVE: 1
ENDOCRINE NEGATIVE: 1
MUSCULOSKELETAL NEGATIVE: 1
PSYCHIATRIC NEGATIVE: 1
HEMATOLOGIC/LYMPHATIC NEGATIVE: 1
CARDIOVASCULAR NEGATIVE: 1

## 2024-11-08 ASSESSMENT — ACTIVITIES OF DAILY LIVING (ADL)
TAKING_MEDICATION: INDEPENDENT
MANAGING_FINANCES: INDEPENDENT
DOING_HOUSEWORK: INDEPENDENT
GROCERY_SHOPPING: INDEPENDENT
DRESSING: INDEPENDENT
BATHING: INDEPENDENT

## 2024-11-08 ASSESSMENT — PAIN SCALES - GENERAL: PAINLEVEL_OUTOF10: 0-NO PAIN

## 2024-11-08 NOTE — PROGRESS NOTES
"[facility]: [facility]  PHYSICAL EXAM      Austen Eagle is a 63 y.o. male that is presenting today for Annual Exam.    Assessment/Plan   - Patient fell off a ladder 2-3 months ago and landed on his L-upper extremity. Was seen in urgent care and by an orthopedic surgeon for elbow pain; workup was relatively unrevealing. Since then, the patient has been dealing with severe hand weakness where, \"I cannot even open a bottle of water.\" On exam, I cannot appreciate that the L-hand is significantly weaker than the R-hand.   - Refer to physical therapy and obtain XRs.   - Encouraged the patient to reach out to his orthopedic surgeon again.  - Since the weakness may be bilateral, I think it is reasonable to image his next as well.  - Otherwise, the patient feels well and denies any acute symptoms / concerns at this time.  - Blood pressure at goal today.  - Encouraged continued dietary, exercise, and lifestyle modification.  - Significant medication and problem list reconciliation done today.     Discussed routine and/or preventative care with the patient as outlined below:  - Labwork:   - Patient had labwork done for this appointment. Discussed today.    - Vitamin D deficiency. Increase OTC supplementation.   - Remainder of labwork looked great.  - Will order labwork for the patient's next appointment. Encouraged the patient to get this labwork done one week prior to the next appointment.  - Imaging:   - Colorectal Cancer: I do not see that the patient has done this. Discussed different options with him.  - Immunizations:   - Encouraged the patient to get the pneumonia (prevnar-20) immunization.  - Encouraged the patient to get their shingles (shingrix) immunizations.  - Encouraged the tetanus (TDAP) booster.  - Discussed seasonal immunizations, including the influenza and COVID-19 immunizations.     Diagnoses and all orders for this visit:  Annual physical exam  Encounter for screening for " cardiovascular disorders  -     ECG 12 Lead  Encounter for colorectal cancer screening  -     Cologuard® colon cancer screening; Future  Encounter for routine laboratory testing  -     Follow Up In Primary Care  Hyperglycemia  -     Hemoglobin A1C; Future  Encounter for prostate cancer screening  -     Prostate Specific Antigen; Future  Encounter for immunization  -     Uric Acid; Future  -     Pneumococcal conjugate vaccine, 20-valent (PREVNAR 20)  Primary osteoarthritis involving multiple joints  -     Referral to Physical Therapy; Future  -     XR hand 3+ views bilateral; Future  -     XR cervical spine complete 4-5 views; Future  Lumbar radiculopathy  Mixed hyperlipidemia  -     Hepatic Function Panel; Future  -     Lipid Panel; Future  Primary hypertension  -     CBC and Auto Differential; Future  -     Basic Metabolic Panel; Future  -     TSH with reflex to Free T4 if abnormal; Future  Vitamin D deficiency  -     cholecalciferol (Vitamin D-3) 125 mcg (5000 UT) capsule; Take 1 capsule (125 mcg) by mouth once daily.  -     Vitamin D 25-Hydroxy,Total (for eval of Vitamin D levels); Future  History of gout  -     Uric Acid; Future    Current Outpatient Medications   Medication Instructions    allopurinol (ZYLOPRIM) 100 mg, oral, Daily    amLODIPine (NORVASC) 10 mg, oral, Daily    benazepril (LOTENSIN) 40 mg, oral, Daily    cholecalciferol (VITAMIN D-3) 125 mcg, oral, Daily    multivitamin tablet 1 tablet, oral, Daily    psyllium (Metamucil) 3.4 gram packet Every 24 hours    vit A/vit C/vit E/zinc/copper (ICAPS AREDS ORAL) Every 24 hours     Subjective   - The patient otherwise feels well and denies any acute symptoms or concerns at this time.  - The patient denies any changes or progression of their chronic medical problems.  - The patient denies any problems or concerns with their medications.      Review of Systems   Constitutional: Negative.    HENT: Negative.     Eyes: Negative.    Respiratory: Negative.      Cardiovascular: Negative.    Gastrointestinal: Negative.    Endocrine: Negative.    Genitourinary: Negative.    Musculoskeletal: Negative.    Skin: Negative.    Allergic/Immunologic: Negative.    Neurological: Negative.    Hematological: Negative.    Psychiatric/Behavioral: Negative.     All other systems reviewed and are negative.     Objective   Vitals:    11/08/24 1448   BP: 130/88   Pulse: 89   Temp: 36.3 °C (97.3 °F)   SpO2: 98%     Body mass index is 25.94 kg/m².  Physical Exam  Vitals and nursing note reviewed.   Constitutional:       General: He is not in acute distress.     Appearance: Normal appearance. He is not ill-appearing.   HENT:      Head: Normocephalic and atraumatic.      Right Ear: Tympanic membrane, ear canal and external ear normal. There is no impacted cerumen.      Left Ear: Tympanic membrane, ear canal and external ear normal. There is no impacted cerumen.      Nose: Nose normal.      Mouth/Throat:      Mouth: Mucous membranes are moist.      Pharynx: Oropharynx is clear. No oropharyngeal exudate or posterior oropharyngeal erythema.   Eyes:      General: No scleral icterus.        Right eye: No discharge.         Left eye: No discharge.      Extraocular Movements: Extraocular movements intact.      Conjunctiva/sclera: Conjunctivae normal.      Pupils: Pupils are equal, round, and reactive to light.   Neck:      Vascular: No carotid bruit.   Cardiovascular:      Rate and Rhythm: Normal rate and regular rhythm.      Pulses: Normal pulses.      Heart sounds: Normal heart sounds. No murmur heard.     No friction rub. No gallop.   Pulmonary:      Effort: Pulmonary effort is normal. No respiratory distress.      Breath sounds: Normal breath sounds.   Abdominal:      General: Abdomen is flat. Bowel sounds are normal.      Palpations: Abdomen is soft.      Tenderness: There is no abdominal tenderness.      Hernia: No hernia is present.   Musculoskeletal:         General: No swelling. Normal range  "of motion.      Cervical back: Normal range of motion.   Lymphadenopathy:      Cervical: No cervical adenopathy.   Skin:     General: Skin is warm and dry.      Coloration: Skin is not jaundiced.      Findings: No rash.   Neurological:      General: No focal deficit present.      Mental Status: He is alert and oriented to person, place, and time. Mental status is at baseline.   Psychiatric:         Mood and Affect: Mood normal.         Behavior: Behavior normal.       Diagnostic Results   Lab Results   Component Value Date    GLUCOSE 99 09/27/2024    CALCIUM 8.8 09/27/2024     09/27/2024    K 4.3 09/27/2024    CO2 24 09/27/2024     09/27/2024    BUN 10 09/27/2024    CREATININE 0.73 09/27/2024     Lab Results   Component Value Date    ALT 47 09/27/2024    AST 37 09/27/2024    ALKPHOS 58 09/27/2024    BILITOT 0.5 09/27/2024     Lab Results   Component Value Date    WBC 8.3 09/27/2024    HGB 15.2 09/27/2024    HCT 44.1 09/27/2024    MCV 94 09/27/2024     09/27/2024     Lab Results   Component Value Date    CHOL 243 (H) 03/20/2024    CHOL 223 (H) 03/20/2023    CHOL 259 (H) 02/22/2021     Lab Results   Component Value Date    HDL 77.0 03/20/2024    HDL 77 03/20/2023    HDL 84 02/22/2021     Lab Results   Component Value Date    LDLCALC 135 (H) 03/20/2024    LDLCALC 123 03/20/2023    LDLCALC 125 02/22/2021     Lab Results   Component Value Date    TRIG 154 (H) 03/20/2024    TRIG 114 03/20/2023    TRIG 249 (H) 02/22/2021     No components found for: \"CHOLHDL\"  Lab Results   Component Value Date    HGBA1C 5.5 03/20/2024     Other labs not included in the list above were reviewed either before or during this encounter.    History   Past Medical History:   Diagnosis Date    Personal history of other diseases of the digestive system 03/21/2024    Personal history of other diseases of the musculoskeletal system and connective tissue 03/20/2023     History reviewed. No pertinent surgical history.  No family " history on file.  Social History     Socioeconomic History    Marital status:      Spouse name: Not on file    Number of children: Not on file    Years of education: Not on file    Highest education level: Not on file   Occupational History    Not on file   Tobacco Use    Smoking status: Former     Types: Cigarettes    Smokeless tobacco: Never   Vaping Use    Vaping status: Never Used   Substance and Sexual Activity    Alcohol use: Yes     Alcohol/week: 24.0 standard drinks of alcohol     Types: 24 Standard drinks or equivalent per week    Drug use: Yes     Types: Marijuana     Comment: sometimes    Sexual activity: Not on file   Other Topics Concern    Not on file   Social History Narrative    Not on file     Social Drivers of Health     Financial Resource Strain: Not on file   Food Insecurity: Not on file   Transportation Needs: Not on file   Physical Activity: Not on file   Stress: Not on file   Social Connections: Not on file   Intimate Partner Violence: Not on file   Housing Stability: Not on file     Allergies   Allergen Reactions    Hay Fever And Allergy Relief Other    Ibuprofen Unknown    Pollen Extracts Other    Aspirin Rash     Current Outpatient Medications on File Prior to Visit   Medication Sig Dispense Refill    allopurinol (Zyloprim) 100 mg tablet Take 1 tablet (100 mg) by mouth once daily. 90 tablet 3    amLODIPine (Norvasc) 10 mg tablet Take 1 tablet (10 mg) by mouth once daily. 90 tablet 3    benazepril (Lotensin) 40 mg tablet Take 1 tablet (40 mg) by mouth once daily. 90 tablet 3    multivitamin tablet Take 1 tablet by mouth once daily.      psyllium (Metamucil) 3.4 gram packet once every 24 hours.      vit A/vit C/vit E/zinc/copper (ICAPS AREDS ORAL) once every 24 hours.      [DISCONTINUED] cholecalciferol (Vitamin D3) 50 mcg (2,000 unit) capsule Take 1 capsule (50 mcg) by mouth once daily.       No current facility-administered medications on file prior to visit.     There is no  immunization history for the selected administration types on file for this patient.  Patient's medical history was reviewed and updated either before or during this encounter.       Dorian Mckeon MD

## 2024-11-08 NOTE — PATIENT INSTRUCTIONS
"- Patient fell off a ladder 2-3 months ago and landed on his L-upper extremity. Was seen in urgent care and by an orthopedic surgeon for elbow pain; workup was relatively unrevealing. Since then, the patient has been dealing with severe hand weakness where, \"I cannot even open a bottle of water.\" On exam, I cannot appreciate that the L-hand is significantly weaker than the R-hand.   - Refer to physical therapy and obtain XRs.   - Encouraged the patient to reach out to his orthopedic surgeon again.  - Since the weakness may be bilateral, I think it is reasonable to image his next as well.  - Otherwise, the patient feels well and denies any acute symptoms / concerns at this time.  - Blood pressure at goal today.  - Encouraged continued dietary, exercise, and lifestyle modification.  - Significant medication and problem list reconciliation done today.     Discussed routine and/or preventative care with the patient as outlined below:  - Labwork:   - Patient had labwork done for this appointment. Discussed today.    - Vitamin D deficiency. Increase OTC supplementation.   - Remainder of labwork looked great.  - Will order labwork for the patient's next appointment. Encouraged the patient to get this labwork done one week prior to the next appointment.  - Imaging:   - Colorectal Cancer: I do not see that the patient has done this. Discussed different options with him.  - Immunizations:   - Encouraged the patient to get the pneumonia (prevnar-20) immunization.  - Encouraged the patient to get their shingles (shingrix) immunizations.  - Encouraged the tetanus (TDAP) booster.  - Discussed seasonal immunizations, including the influenza and COVID-19 immunizations.   "

## 2024-11-22 ENCOUNTER — EVALUATION (OUTPATIENT)
Dept: PHYSICAL THERAPY | Facility: CLINIC | Age: 64
End: 2024-11-22
Payer: MEDICARE

## 2024-11-22 DIAGNOSIS — M15.0 PRIMARY OSTEOARTHRITIS INVOLVING MULTIPLE JOINTS: ICD-10-CM

## 2024-11-22 DIAGNOSIS — M54.16 LUMBAR RADICULOPATHY: Primary | ICD-10-CM

## 2024-11-22 ASSESSMENT — ENCOUNTER SYMPTOMS
PAIN SCALE: 3
PAIN SCALE AT LOWEST: 3
PAIN SCALE AT HIGHEST: 9

## 2024-11-22 NOTE — PROGRESS NOTES
"Physical Therapy Evaluation and Treatment     Patient Name: Austen Eagle \"Bernardo\"  MRN: [id number]  Encounter date: 2024       Visit # 1 of ***  Visits/Dates Authorized: 2024; MEDICARE A/B, [employer] SUPP, [place], NO AUTH ( $0 USED PT/ST)     Current Problem:   Problem List Items Addressed This Visit    None    Precautions:          Subjective Evaluation    History of Present Illness  Date of onset: 2024  Mechanism of injury: Patient fell off a ladder 2-3 months ago and landed on his L-upper extremity. Was seen in urgent care and by an orthopedic surgeon for elbow pain; workup was relatively unrevealing. Since then, the patient has been dealing with severe hand weakness where, \"I cannot even open a bottle of water.\" On exam, I cannot appreciate that the L-hand is significantly weaker than the R-hand.              - Refer to physical therapy and obtain XRs.              - Encouraged the patient to reach out to his orthopedic surgeon again.  - Since the weakness may be bilateral, I think it is reasonable to image his next as well.   he fell off a ladder from approximately 8 feet up.  He fell and sustained multiple bruises, but right elbow has been most symptomatic.  He was seen in urgent care where radiographs were obtained and there was some concern for possible occult elbow injury.  Placed into sling and referred for further treatment.  Patient reports continued aching/throbbing pains into his elbow.  He does also have aching pains into other areas of bruising, but no specific activity limitations with other areas of his body.    Denies any neck pain   C/o R hip radicular sx/numbness occassionally.      Pain  Current pain rating: 3  At best pain rating: 3  At worst pain ratin  Location: shooting pains in 1st and second fingers. L worse than R.  Exacerbated by: grabbing, turning, pressure on jts.          Objective    Vitals:       Cervical/Thoracic Spine    Lumbar Spine         {Balance Assessments:42449}   "     Treatments:     Therapeutic Exercise 05937:     Neuromuscular Re-Ed 07938:     Therapeutic Activity 33284:    Gait Training 81571:     Manual Therapy 14160:     Modalities:   {Modalities Used:56881}      HEP / Access Codes:       Assessment/Plan   {Complexities:90148}     Post-Treatment Symptoms:       Goals:

## 2024-11-26 ENCOUNTER — TELEPHONE (OUTPATIENT)
Dept: PRIMARY CARE | Facility: CLINIC | Age: 64
End: 2024-11-26
Payer: MEDICARE

## 2024-11-26 DIAGNOSIS — R19.5 POSITIVE COLORECTAL CANCER SCREENING USING COLOGUARD TEST: ICD-10-CM

## 2024-11-26 LAB — NONINV COLON CA DNA+OCC BLD SCRN STL QL: POSITIVE

## 2024-12-02 NOTE — TELEPHONE ENCOUNTER
Patient wife called requesting referral for gi. Patient given the office info and phone number to call for appointment.

## 2024-12-10 ENCOUNTER — EVALUATION (OUTPATIENT)
Dept: OCCUPATIONAL THERAPY | Facility: CLINIC | Age: 64
End: 2024-12-10
Payer: MEDICARE

## 2024-12-10 ENCOUNTER — APPOINTMENT (OUTPATIENT)
Dept: OCCUPATIONAL THERAPY | Facility: CLINIC | Age: 64
End: 2024-12-10
Payer: MEDICARE

## 2024-12-10 DIAGNOSIS — M79.642 BILATERAL HAND PAIN: Primary | ICD-10-CM

## 2024-12-10 DIAGNOSIS — M79.641 BILATERAL HAND PAIN: Primary | ICD-10-CM

## 2024-12-10 PROCEDURE — A9300 EXERCISE EQUIPMENT: HCPCS

## 2024-12-10 PROCEDURE — 97165 OT EVAL LOW COMPLEX 30 MIN: CPT | Mod: GO

## 2024-12-10 PROCEDURE — L3923 HFO WITHOUT JOINTS PRE CST: HCPCS

## 2025-01-08 ENCOUNTER — TREATMENT (OUTPATIENT)
Dept: OCCUPATIONAL THERAPY | Facility: CLINIC | Age: 65
End: 2025-01-08
Payer: MEDICARE

## 2025-01-08 DIAGNOSIS — M79.641 BILATERAL HAND PAIN: ICD-10-CM

## 2025-01-08 DIAGNOSIS — M79.642 BILATERAL HAND PAIN: ICD-10-CM

## 2025-01-08 PROCEDURE — 97110 THERAPEUTIC EXERCISES: CPT | Mod: GO

## 2025-01-08 PROCEDURE — 97140 MANUAL THERAPY 1/> REGIONS: CPT | Mod: GO

## 2025-01-08 NOTE — PROGRESS NOTES
"Occupational Therapy    Occupational Therapy Treatment/Discharge Summary    Name: Austen Eagle \"Bernardo\"  MRN: [id number]  : 1960  Date: 2025  Time Calculation  Start Time: 910  Stop Time: 945  Time Calculation (min): 35 min  OT Therapeutic Procedures Time Entry  Manual Therapy Time Entry: 20  Therapeutic Exercise Time Entry: 10,      Current Problem:  Problem List Items Addressed This Visit             ICD-10-CM    Bilateral hand pain M79.641, M79.642       Assessment: Pt has responded well to intervention and demonstrates improved AROM B digits, decreased pain, and improved  strength. All goals achieved this session      Plan: Discharge OT and continue with HEP        Subjective \" My hands are feeling better and I don't have as much pain\"  \"I can open water bottles now\"        Pain Assessment: B hands   0/10 at best   3 /10 at worst     Objective    Hand Strength Measures (lbs)    R L   Dynamometer  75 (60 lbs at eval ) 45 (40 lbs at eval )    Lateral Pinch       3jaw Pinch          Right Hand AROM (degrees)    MCP PIP DIP DPC   IF        3 ( was 4 )    MF       0 (was 2 at eval)   RF       0 ( was 2)   SF       0 ( was 2)   Thumb           Thumb RABD           Thumb PABD              Left Hand AROM (degrees)    MCP PIP DIP DPC   IF        0 ( was 2 at eval )   MF       0 ( was 2)   RF       0 (was 2)   SF       0 ( was 2)   Thumb           Thumb RABD           Thumb PABD           Modalities:     Communication:     Splinting:     Therapeutic Exercise   B hook fists x 10x3   Isometric digit ABD/ADD B x 10x3   Manual Therapy   STM to intrinsics B   Intrinsic stretch B   Wrist extension stretch     Therapy/Activity:   Strength:     Other Activity:     Outcome Measures:      OP EDUCATION:       Goals:  1. Pt will be independent with HEP to support progress. Met   2. Pt will have no more than 1/10 B hand pain with ADL's. Met   3. Pt will increase B  strength by 20 lbs to improve  and ability " to open jars. Met   4. Pt will increase score on UEFI by 20 pts. Met   5. Pt will be within 1 cm from DPC B digits to improve  during ADL's. Met

## 2025-02-03 ENCOUNTER — DOCUMENTATION (OUTPATIENT)
Dept: GASTROENTEROLOGY | Facility: HOSPITAL | Age: 65
End: 2025-02-03
Payer: MEDICARE

## 2025-02-03 NOTE — PROGRESS NOTES
Dr. Dorian Hall's office received a referral for this patient from Dr. Gray Harley requesting an EMR. Dr. Brian Mancera reviewed the referral on 2/2/2025 and approved scheduling the patient for a colonoscopy/EMR.    Marlena Sellers was notified to call and schedule this patient. Contact Radha Moore RN at [phone] for any questions.      Below is supporting clinical information form the referral sent by Dr. Harley's office. Referral documents from the referring office were scanned under Media, dated 1/30/2025.     Reason for Referral: Patient needs EMR     Diagnosis: History of colon polyps     Colonoscopy 1/15/2025 performed by Dr. Gray Harley:  ·       Diverticulosis in the sigmoid colon  ·       Internal non-bleeding hemorrhoids  ·       The examined portion of the ileum was normal  ·       One 15 mm polyp in the cecum with depressed center. Biopsied.  ·       Two 4 to 7 mm polyps in the proximal ascending colon, removed.  ·       One 40 mm polyp in the mid ascending colon. Biopsied.  ·       One 12 mm polyp in the mid ascending colon, removed.  ·       Two 3 mm polyps in the distal ascending colon, removed.  ·       Two 4 to 10 mm polyps at the hepatic flexure removed.  ·       Four 6 mm polyps in the transverse colon, in the descending colon and in the mid rectum, removed.      Surgical Pathology Report 1/16/2025:  ·       Cecal Colon Lesion Biopsy: Fragments of TA  ·       Proximal Ascending Colon polyp x 2: TA  ·       Proximal Ascending Colon Biopsy: Fragments of TA  ·       Mid Ascending Colon Polyp: TA  ·       Distal Ascending Colon Polyp Biopsy x 2: TA  ·       Hepatic Flexure Colon Polyp x 2: Fragments of/TVA  ·       Transverse Colon Polyp: TA  ·       Descending Colon Polyp: TVA  ·       Rectal Colon Polyp: TA

## 2025-03-03 DIAGNOSIS — Z12.11 COLON CANCER SCREENING: Primary | ICD-10-CM

## 2025-03-03 RX ORDER — SODIUM, POTASSIUM,MAG SULFATES 17.5-3.13G
0.51 SOLUTION, RECONSTITUTED, ORAL ORAL SEE ADMIN INSTRUCTIONS
Qty: 354 ML | Refills: 0 | Status: SHIPPED | OUTPATIENT
Start: 2025-03-03

## 2025-03-06 ENCOUNTER — ANESTHESIA (OUTPATIENT)
Dept: GASTROENTEROLOGY | Facility: HOSPITAL | Age: 65
End: 2025-03-06
Payer: MEDICARE

## 2025-03-06 ENCOUNTER — ANESTHESIA EVENT (OUTPATIENT)
Dept: GASTROENTEROLOGY | Facility: HOSPITAL | Age: 65
End: 2025-03-06
Payer: MEDICARE

## 2025-03-06 ENCOUNTER — HOSPITAL ENCOUNTER (OUTPATIENT)
Dept: GASTROENTEROLOGY | Facility: HOSPITAL | Age: 65
Discharge: HOME | End: 2025-03-06
Payer: MEDICARE

## 2025-03-06 VITALS
RESPIRATION RATE: 16 BRPM | SYSTOLIC BLOOD PRESSURE: 148 MMHG | DIASTOLIC BLOOD PRESSURE: 86 MMHG | HEART RATE: 77 BPM | BODY MASS INDEX: 26.54 KG/M2 | OXYGEN SATURATION: 95 % | TEMPERATURE: 97.3 F | HEIGHT: 71 IN | WEIGHT: 189.6 LBS

## 2025-03-06 DIAGNOSIS — K63.5 POLYP OF COLON, UNSPECIFIED PART OF COLON, UNSPECIFIED TYPE: ICD-10-CM

## 2025-03-06 PROCEDURE — 2500000004 HC RX 250 GENERAL PHARMACY W/ HCPCS (ALT 636 FOR OP/ED)

## 2025-03-06 PROCEDURE — 3700000002 HC GENERAL ANESTHESIA TIME - EACH INCREMENTAL 1 MINUTE

## 2025-03-06 PROCEDURE — 3700000001 HC GENERAL ANESTHESIA TIME - INITIAL BASE CHARGE

## 2025-03-06 PROCEDURE — 2720000007 HC OR 272 NO HCPCS

## 2025-03-06 PROCEDURE — 45385 COLONOSCOPY W/LESION REMOVAL: CPT | Performed by: INTERNAL MEDICINE

## 2025-03-06 PROCEDURE — 45380 COLONOSCOPY AND BIOPSY: CPT | Performed by: INTERNAL MEDICINE

## 2025-03-06 PROCEDURE — 7100000010 HC PHASE TWO TIME - EACH INCREMENTAL 1 MINUTE

## 2025-03-06 PROCEDURE — 7100000009 HC PHASE TWO TIME - INITIAL BASE CHARGE

## 2025-03-06 RX ORDER — FENTANYL CITRATE 50 UG/ML
INJECTION, SOLUTION INTRAMUSCULAR; INTRAVENOUS AS NEEDED
Status: DISCONTINUED | OUTPATIENT
Start: 2025-03-06 | End: 2025-03-06

## 2025-03-06 RX ORDER — MIDAZOLAM HYDROCHLORIDE 1 MG/ML
INJECTION INTRAMUSCULAR; INTRAVENOUS AS NEEDED
Status: DISCONTINUED | OUTPATIENT
Start: 2025-03-06 | End: 2025-03-06

## 2025-03-06 RX ORDER — PROPOFOL 10 MG/ML
INJECTION, EMULSION INTRAVENOUS AS NEEDED
Status: DISCONTINUED | OUTPATIENT
Start: 2025-03-06 | End: 2025-03-06

## 2025-03-06 RX ADMIN — MIDAZOLAM HYDROCHLORIDE 2 MG: 1 INJECTION, SOLUTION INTRAMUSCULAR; INTRAVENOUS at 08:17

## 2025-03-06 RX ADMIN — FENTANYL CITRATE 25 MCG: 50 INJECTION, SOLUTION INTRAMUSCULAR; INTRAVENOUS at 08:33

## 2025-03-06 RX ADMIN — PROPOFOL 200 MCG/KG/MIN: 10 INJECTION, EMULSION INTRAVENOUS at 08:22

## 2025-03-06 RX ADMIN — PROPOFOL 80 MG: 10 INJECTION, EMULSION INTRAVENOUS at 08:21

## 2025-03-06 ASSESSMENT — PAIN - FUNCTIONAL ASSESSMENT
PAIN_FUNCTIONAL_ASSESSMENT: 0-10
PAIN_FUNCTIONAL_ASSESSMENT: 0-10
PAIN_FUNCTIONAL_ASSESSMENT: UNABLE TO SELF-REPORT
PAIN_FUNCTIONAL_ASSESSMENT: 0-10

## 2025-03-06 ASSESSMENT — COLUMBIA-SUICIDE SEVERITY RATING SCALE - C-SSRS
2. HAVE YOU ACTUALLY HAD ANY THOUGHTS OF KILLING YOURSELF?: NO
6. HAVE YOU EVER DONE ANYTHING, STARTED TO DO ANYTHING, OR PREPARED TO DO ANYTHING TO END YOUR LIFE?: NO
1. IN THE PAST MONTH, HAVE YOU WISHED YOU WERE DEAD OR WISHED YOU COULD GO TO SLEEP AND NOT WAKE UP?: NO

## 2025-03-06 ASSESSMENT — PAIN SCALES - GENERAL
PAINLEVEL_OUTOF10: 0 - NO PAIN
PAIN_LEVEL: 0

## 2025-03-06 ASSESSMENT — ENCOUNTER SYMPTOMS: CONSTITUTIONAL NEGATIVE: 1

## 2025-03-06 NOTE — H&P
"History Of Present Illness  Austen Eagle \"Bernardo\" is a 64 y.o. male presenting with several polyps found by Dr. Harley and two were too large for removal.  He presents for repeat colonoscopy with planned EMR.     Past Medical History  Past Medical History:   Diagnosis Date    Diverticulosis 6/2010    Hypertension 2010    Personal history of other diseases of the digestive system 03/21/2024    Personal history of other diseases of the musculoskeletal system and connective tissue 03/20/2023     Surgical History  History reviewed. No pertinent surgical history.  Social History  He reports that he has never smoked. He has never used smokeless tobacco. He reports current alcohol use of about 12.0 standard drinks of alcohol per week. He reports current drug use. Frequency: 7.00 times per week. Drug: Marijuana.    Family History  Family History   Problem Relation Name Age of Onset    Diabetes Mother Robyn Mathews         Allergies  Allergies   Allergen Reactions    Hay Fever And Allergy Relief Other    Ibuprofen Unknown    Pollen Extracts Other    Aspirin Rash     Review of Systems   Constitutional: Negative.         Physical Exam  Constitutional:       Appearance: Normal appearance.   Cardiovascular:      Rate and Rhythm: Normal rate and regular rhythm.   Pulmonary:      Effort: Pulmonary effort is normal.      Breath sounds: Normal breath sounds.   Abdominal:      General: Abdomen is flat.      Palpations: Abdomen is soft.   Neurological:      Mental Status: He is alert.   Psychiatric:         Mood and Affect: Mood normal.         Behavior: Behavior normal.         Thought Content: Thought content normal.         Judgment: Judgment normal.          Last Recorded Vitals  Blood pressure 167/85, pulse 102, temperature 36.6 °C (97.9 °F), temperature source Temporal, resp. rate 16, height 1.803 m (5' 11\"), weight 86 kg (189 lb 9.5 oz), SpO2 95%.    Assessment/Plan   Colonoscopy with EMR as planned     Dorian Hall, DO  "

## 2025-03-06 NOTE — NURSING NOTE
0946- Pt assisted with dressing with help of family. IV removed dressing applied. Discharge instructions provided to pt and family. Educated on medications, effects of anesthesia, and homecoming care. Pt and family verbalizing understanding of all instructions provided at this time. All questions and concerns answered. Pt given contact information for provider.     0924- Pt assisted to main lobby via  by transport. Dc in stable condition to home. All belongings taken with pt.

## 2025-03-06 NOTE — ANESTHESIA POSTPROCEDURE EVALUATION
"Patient: Austen Eagle \"Bernardo\"    Procedure Summary       Date: 03/06/25 Room / Location: [facility]    Anesthesia Start: 0817 Anesthesia Stop: 0910    Procedure: COLONOSCOPY Diagnosis: Polyp of colon, unspecified part of colon, unspecified type    Scheduled Providers: Dorian Hall DO; Skyler Pedro MD; LOLITA Foster-CRNA Responsible Provider: Skyler Pedro MD    Anesthesia Type: MAC ASA Status: 2            Anesthesia Type: MAC    Vitals Value Taken Time   /72 03/06/25 0907   Temp 36.3 °C (97.3 °F) 03/06/25 0907   Pulse 70 03/06/25 0907   Resp 16 03/06/25 0907   SpO2 95 % 03/06/25 0907       Anesthesia Post Evaluation    Patient location during evaluation: bedside  Patient participation: complete - patient cannot participate  Level of consciousness: awake  Pain score: 0  Pain management: adequate  Airway patency: patent  Cardiovascular status: acceptable and hemodynamically stable  Respiratory status: acceptable and nonlabored ventilation  Hydration status: acceptable  Postoperative Nausea and Vomiting: none        There were no known notable events for this encounter.    "

## 2025-03-06 NOTE — DISCHARGE INSTRUCTIONS

## 2025-03-06 NOTE — ANESTHESIA PREPROCEDURE EVALUATION
"Patient: Austen Eagle \"Bernardo\"    Procedure Information       Date/Time: 03/06/25 0810    Scheduled providers: Dorian Hall DO; Skyler Pedro MD    Procedure: COLONOSCOPY    Location: [facility]            Relevant Problems   Anesthesia (within normal limits)      Cardiac   (+) HLD (hyperlipidemia)   (+) HTN (hypertension)   (+) PAC (premature atrial contraction)      Pulmonary  Marijuana smoker  Env allergies      Neuro   (+) Lumbar radiculopathy      GI  Diverticulosis hx      /Renal  Diverticulosis  Colon polyps      Liver (within normal limits)      Hematology (within normal limits)      Musculoskeletal   (+) OA (osteoarthritis)       Clinical information reviewed:                   NPO Detail:  No data recorded     Physical Exam    Airway  Mallampati: II     Cardiovascular    Dental    Pulmonary    Abdominal            Anesthesia Plan    History of general anesthesia?: yes  History of complications of general anesthesia?: no    ASA 2     MAC     intravenous induction   Anesthetic plan and risks discussed with patient.      "

## 2025-03-13 LAB
LABORATORY COMMENT REPORT: NORMAL
PATH REPORT.FINAL DX SPEC: NORMAL
PATH REPORT.GROSS SPEC: NORMAL
PATH REPORT.RELEVANT HX SPEC: NORMAL
PATH REPORT.TOTAL CANCER: NORMAL

## 2025-03-19 ENCOUNTER — TELEPHONE (OUTPATIENT)
Dept: GASTROENTEROLOGY | Facility: CLINIC | Age: 65
End: 2025-03-19
Payer: MEDICARE

## 2025-03-19 DIAGNOSIS — D12.2 ADENOMATOUS POLYP OF ASCENDING COLON: Primary | ICD-10-CM

## 2025-03-19 NOTE — TELEPHONE ENCOUNTER
----- Message from Rashida MOLINA sent at 3/18/2025 10:30 AM EDT -----  Regarding: RESULTS  Calling for test results  [phone]

## 2025-03-19 NOTE — TELEPHONE ENCOUNTER
Call returned.  Questions answered.    He understands the polyps in his proximal colon are not amenable to endoscopic resection.   He prefers to have surgery at [facility] rather than going back to [place].  Referral placed to Dr. Harrison Dash.    Dorian Hall, DO

## 2025-03-20 ENCOUNTER — TELEPHONE (OUTPATIENT)
Dept: GENETICS | Facility: CLINIC | Age: 65
End: 2025-03-20
Payer: MEDICARE

## 2025-03-20 DIAGNOSIS — D12.6 ADENOMATOUS POLYP OF COLON, UNSPECIFIED PART OF COLON: ICD-10-CM

## 2025-03-26 ENCOUNTER — CLINICAL SUPPORT (OUTPATIENT)
Dept: GENETICS | Facility: CLINIC | Age: 65
End: 2025-03-26
Payer: MEDICARE

## 2025-03-26 VITALS
DIASTOLIC BLOOD PRESSURE: 84 MMHG | HEART RATE: 94 BPM | RESPIRATION RATE: 18 BRPM | SYSTOLIC BLOOD PRESSURE: 157 MMHG | WEIGHT: 188.5 LBS | BODY MASS INDEX: 26.29 KG/M2 | TEMPERATURE: 99.1 F | OXYGEN SATURATION: 94 %

## 2025-03-26 DIAGNOSIS — Z71.83 ENCOUNTER FOR NONPROCREATIVE GENETIC COUNSELING: Primary | ICD-10-CM

## 2025-03-26 DIAGNOSIS — D12.6 ADENOMATOUS POLYP OF COLON, UNSPECIFIED PART OF COLON: ICD-10-CM

## 2025-03-26 DIAGNOSIS — Z83.719 FAMILY HISTORY OF COLONIC POLYPS: ICD-10-CM

## 2025-03-26 PROCEDURE — GENMD PR GENETICS VISIT (MEDICAID/MEDICARE)

## 2025-03-26 ASSESSMENT — PAIN SCALES - GENERAL: PAINLEVEL_OUTOF10: 7

## 2025-03-26 NOTE — PROGRESS NOTES
"History of Present Illness:  Austen Eagle \"Bernardo\" is a 64 y.o. male with a personal and family history of of colon polyps,  Austen Npaier\" was referred to the Cancer Genetics Clinic at [facility] by Harrison Dash MD. Austen Bridges" is interested in genetic testing to to clarify his personal risk for cancer, as well as the risks to his family members. Austen was accompanied by his wife, Ludy, for the duration of the appointment.       Cancer Medical History:  Personal history of cancer? No    Personal history of neoplasia Yes    Colonoscopy 1/15/2025 performed by Dr. Gray Harley:  ·       Diverticulosis in the sigmoid colon  ·       Internal non-bleeding hemorrhoids  ·       The examined portion of the ileum was normal  ·       One 15 mm polyp in the cecum with depressed center. Biopsied.   ·       Two 4 to 7 mm polyps in the proximal ascending colon, removed.   ·       One 40 mm polyp in the mid ascending colon. Biopsied.   ·       One 12 mm polyp in the mid ascending colon, removed.   ·       Two 3 mm polyps in the distal ascending colon, removed.   ·       Two 4 to 10 mm polyps at the hepatic flexure removed.   ·       Four 6 mm polyps in the transverse colon, in the descending colon and in the mid rectum, removed.      Surgical Pathology Report 1/16/2025:  ·       Cecal Colon Lesion Biopsy: Fragments of TA  ·       Proximal Ascending Colon polyp x 2: TA  ·       Proximal Ascending Colon Biopsy: Fragments of TA  ·       Mid Ascending Colon Polyp: TA  ·       Distal Ascending Colon Polyp Biopsy x 2: TA  ·       Hepatic Flexure Colon Polyp x 2: Fragments of/TVA  ·       Transverse Colon Polyp: TA  ·       Descending Colon Polyp: TVA  ·       Rectal Colon Polyp: TA    Recent colonoscopy, March 2025. (This was a follow up from colonoscopy in January 2025) Findings:  Sessile polyp measuring 5-9 mm in the rectum (hyperplastic); performed cold forceps biopsy with complete en bloc removal  Polyp " measuring 5-9 mm in the cecum (tubular adenoma); performed cold snare with complete removal  Depressed polyp measuring 20 mm or greater in the cecum (tubular adenoma) lift performed by injecting a solution into the submucosa; performed cold forceps biopsy  80 mm benign-appearing LST-G polyp in the ascending colon  Ten or more polyps measuring smaller than 5 mm in the hepatic flexure and transverse colon (4 tubular adenoma); performed cold snare removal  Small and large diverticula of moderate severity causing moderate luminal narrowing in the sigmoid colon    Colonoscopy in 2010 - reportedly had tubular adenomas, patient estimates to be about 4 total. No records available.    Planning surgery but this has not yet been scheduled    Prior hereditary cancer (germline) genetic testing? No    Prior hereditary cancer (germline) genetic testing in family members? No    Cancer screening history:  Colonoscopy? See workup above  Endoscopy?  3/6/25  Dermatology? No   Prostate?  0.4 in March 2024  Other cancer screening? No    Family history:  A 4-generation pedigree was obtained and was significant for the following:     - Maternal cousin (living, 60's) reportedly had to have part of his colon removed in his 40's due to colon polyps  - Some maternal uncles may have had colon polyps, but Austen was uncertain and explained he did not have much information about his family history.    Maternal ancestry is English. Paternal ancestry is English. There is no known Ashkenazi Sikhism ancestry. Consanguinity was denied.     Genetic counseling:    Summary  Mr. Eagle is a 64 y.o. male with a personal and family history of colon polyps.    Austen Eagle's reported history is concerning for possible hereditary  polyposis. Mr. Eagle meets current national (NCCN) criteria for testing of Adenomatous polyposis susceptibility genes, including APC and MUTYH     Testing is medically necessary, as it could help clarify cancer risks in the family,  and may impact recommendations for cancer surveillance and/or the need for risk-reducing options.    Austen is interested in testing, which is recommended, and was ordered today via the [employer] panel which looks at the following genes: AIP, ALK, APC, FAISAL, AXIN2, BAP1, BARD1, BMPR1A, BRCA1, BRCA2, BRIP1, CDC73, CDH1, CDK4, CDKN1B, CDKN2A, CEBPA, CHEK2, CTNNA1, DDX41, DICER1, EGFR, EPCAM, ETV6, FH, FLCN, GATA2, GREM1, HOXB13, KIT, LZTR1, MAX, MBD4, MEN1, MET, MITF, MLH1, MLH3, MSH2, MSH3, MSH6, MUTYH, NF1, NF2, NTHL1, PALB2, PDGFRA, PHOX2B, PMS2, POLD1, POLE, POT1, WLKBP6H, PTCH1, PTEN, RAD51C, RAD51D, RB1, RET, RNF43, RPS20, RUNX1, SDHA, SDHAF2, SDHB, SDHC, SDHD, SMAD4, SMARCA4, SMARCB1, SMARCE1, STK11, SUFU, NBUK077, TP53, TSC1, TSC2, VHL, WT1. Our discussion is summarized below.    Genetic Counseling Discussion    We reviewed genes and chromosomes, inherited forms of  cancer and colon polyps.  We discussed that most cancers are not due to an inherited genetic susceptibility.  However, in about 5-10% of families, there is an inherited genetic mutation that can make a person more susceptible to developing certain forms of cancer.  Within families with a genetic predisposition to cancer, we often see certain patterns, such as multiple family members with cancer and cancers occurring in multiple generations. In addition, earlier onset and/or bilateral cancers are suggestive of an inherited predisposition. There also tends to be a clustering of certain types of related cancer in these families, such as breast and ovarian cancers, or colon and uterine cancers.     We discussed Familial Adenomatous Polyposis Syndrome (FAP) as an example of a hereditary cause of multiple adenomas within an individual. Please note there are multiple genes that can be seen related to polyposis, and FAP is just one example. FAP is caused by the APC gene. There are two forms of FAP, classical (which is more severe) and  attenuated (which is a more mild presentation). Attenuated FAP is characterized by the presence of 10-99 cumulative polyps in an individual throughout their lifetime (the average being 30 polyps). These individuals have a higher risk than the general population to develop colon cancer with the lifetime risk estimated at 70% with average onset >50 years of age (general population risk for colon cancer is about 4%) [Cornelio et al 2008]. In addition to colon findings, upper GI findings such as thyroid and duodenal cancer can be seen in these individuals. Those with FAP (attenuated or classic) have an estimated <2% lifetime risk of thyroid cancer and a lifetime risk of duodenal cancer that is estimated to be 4-12%.      Individuals with attenuated FAP are recommended to consider additional cancer screening measure. This could include annual colonoscopies, preventative surgery, thyroid ultrasounds every 2-5 years, and annual upper endoscopy.      Most polyposis genes, such as FAP, are inherited in an autosomal dominant fashion. This means that if an individual has a change in one of these genes, their siblings and children have a 50% chance of also having that gene change and a 50% chance of not having the gene change. Please note there is one polyposis gene (called MUTYH) that is inherited in an autosomal recessive fashion. This means BOTH copies of the MUTYH gene must be broken (have a mutation) for that individual to be affected with the polyposis syndrome.    We discussed that there are multiple genes associated with increased colon/polyp. Some genes are considered highly penetrant cancer genes, meaning a mutation in the gene confers a high risk of cancer or polyps. On the other hand, there are other intermediate (moderate risk) cancer genes. For many of the moderate risk genes, there is sometimes limited information regarding the degree to which a mutation in the gene affects risk of different types of cancers.  Additionally, for some of these moderate risk genes, the appropriate management for individuals who have a mutation in one of these genes is not always clear. In many cases, even if an individual tests positive for a mutation in a moderate risk gene, recommendations are still based on the family history, not the positive test result. Additionally, unexpected findings may occur, where a mutation is identified that confers a risk for a cancer not seen in the family history.  Lastly, in the era of multigene panel testing, we know that more than one pathogenic or likely pathogenic variant can be identified in any one individual and/or family    Austen Eagle was counseled about hereditary cancer susceptibility including cancer risks, options for increased screening and/or risk reduction, genetic testing, and the implications for other family members.  We discussed different panel options available to Mr. Eagle. After a discussion about the risks, benefits, and limitations of genetic testing, Austen Eagle elected to undergo genetic testing for hereditary cancer using the panel described above. Austen Eagle gave verbal consent to proceed with testing.    We reviewed the types of results we can get back:   - A positive result means that we identified a change in a gene that confers an increased cancer risk for cancer. We will discuss potential changes in management for him and his family based on the specific gene mutation found.    - A negative (normal) result clears him for many cancer predisposition syndromes, but cannot clear him for any/all cancer predisposition risks. He would continue to be screened based on his personal and family history.    - A Variant of Uncertain Significance (VUS) means that some kind of change was found in a gene, but it is currently unknown whether this specific change is harmful.  These results do not  recommendations or warrant familial variant testing.    We discussed  the Genetic Information Nondiscrimination Act (GRAHAM). We discussed the protections and limitations of GRAHAM. GRAHAM generally makes in illegal for health insurance companies, group health plans, and most employers (with >15 employees) to discriminate based on genetic information. It does not protect against genetic discrimination for life insurance, disability insurance, long-term care, or other insurances.    Mr. Eagle will return to the Cancer Genetics Clinic to discuss his testing results.  At that time, we will make recommendations for both Mr. Eagle and their family members in terms of cancer screening and/or cancer risk reduction options. Mr. Eagle was reminded to follow his primary care providers' recommendations for age-related cancer screenings, such as mammograms, colonoscopies, etc.    We discussed that Austen Eagle's test results may be released to his chart when they are reported. Most people choose to review the results with their provider at their follow up visit. However, if he chooses to view his results in advance of his visit, the provider may not be available to discuss. Austen Eagle was advised to keep his follow-up appointment regardless of his results to discuss appropriate management and referrals.      PLAN:  Mr. Eagle elected to undergo genetic testing for hereditary cancer using the [employer] [facility] Insight panel, which evaluates the following genes:  AIP, ALK, APC, FAISAL, AXIN2, BAP1, BARD1, BMPR1A, BRCA1, BRCA2, BRIP1, CDC73, CDH1, CDK4, CDKN1B, CDKN2A, CEBPA, CHEK2, CTNNA1, DDX41, DICER1, EGFR, EPCAM, ETV6, FH, FLCN, GATA2, GREM1, HOXB13, KIT, LZTR1, MAX, MBD4, MEN1, MET, MITF, MLH1, MLH3, MSH2, MSH3, MSH6, MUTYH, NF1, NF2, NTHL1, PALB2, PDGFRA, PHOX2B, PMS2, POLD1, POLE, POT1, SBPRK7C, PTCH1, PTEN, RAD51C, RAD51D, RB1, RET, RNF43, RPS20, RUNX1, SDHA, SDHAF2, SDHB, SDHC, SDHD, SMAD4, SMARCA4, SMARCB1, SMARCE1, STK11, SUFU, [id number], TP53, TSC1, TSC2, VHL, WT1  .  Austen Eagle gave  their verbal consent to proceed with testing.    Mr. Eagle had his blood drawn today   Results are typically available within 3-4 weeks from the time of sample reception. Mr. Eagle will return to the Cancer Genetics Clinic discuss his test results.  Our  will call Austen Eagle when results return to make a follow up appointment.  We remain available to Mr. Eagle at [phone] if any questions arise regarding information discussed at today's visit.    Rosa Ko MS, GC  Licensed Genetic Counselor  [facility]  Phone: [phone]     Total time spent on day of encounter: 55 minutes (23 minutes with patient, 32 minutes on pre/post patient care activities, including documentation).

## 2025-03-31 NOTE — PROGRESS NOTES
Austen Eagle is a 64 year old male referred by Dr. Dorian Hall for evaluation of colon polyps.    History of diverticulitis with abscess.  S/P IR drainage of abscess in 2010.  Colonoscopy in 2010 showed diverticulosis and tubular adenomas polyps.  He was supposed to follow up in 2013 but was lost to follow up.    November 2024 (+)Cologaurd.       Has had recurrent or chronic abdominal pain or issues since 2010.  Voids nuts and seeds.    3/06/2025 Colonoscopy to TI:  Findings  Sessile polyp measuring 5-9 mm in the rectum; performed cold forceps biopsy with complete en bloc removal  Polyp measuring 5-9 mm in the cecum; performed cold snare with complete removal  Depressed polyp measuring 20 mm or greater in the cecum; lift performed by injecting a solution into the submucosa; performed cold forceps biopsy  80 mm benign-appearing LST-G polyp in the ascending colon  Ten or more polyps measuring smaller than 5 mm in the hepatic flexure and transverse colon; performed cold snare removal  Small and large diverticula of moderate severity causing moderate luminal narrowing in the sigmoid colon  Pathology:   A.  Rectum polyp:  --Hyperplastic polyp     B.  Colon, cecum, polyp:  --Tubular adenoma, multiple fragments     C.  Colon, cecum, polyp:  --Tubular adenoma, multiple fragments     D.  Transverse colon polyp x 4:  --Tubular adenoma, multiple fragments     March 26, 2025 Genetics appointment.  Results pending.    Visit Vitals  /86   Pulse 82   Temp 36.5 °C (97.7 °F)   Wt 85.7 kg (189 lb)   SpO2 96%   BMI 26.36 kg/m²   Smoking Status Never   BSA 2.07 m²     Past Medical History  HTN  Arthritis  Diverticulitis  Colon polyps    Surgical History  IR drainage of abscess     Social History  Smoking: Marijuana  ETOH:    Family History      Review of Systems  Constitutional: Negative for fever, chills, anorexia, weight loss, malaise     ENMT: Negative for nasal discharge, congestion, ear pain, mouth pain, throat  pain     Respiratory: Negative for cough, hemoptysis, wheezing, shortness of breath     Cardiac: Negative for chest pain, dyspnea on exertion, orthopnea, palpitations, syncope, (+)HTN     Gastrointestinal: Negative for nausea, vomiting, diarrhea, constipation, abdominal pain, (+)DIVERTICULITIS, (+)COLON POLYPS    Genitourinary: Negative for discharge, dysuria, flank pain, frequency, hematuria     Musculoskeletal: Negative for decreased ROM, pain, swelling, weakness, (+)ARTHRITIS     Neurological: Negative for dizziness, confusion, headache, seizures, syncope     Psychiatric: Negative for mood changes, anxiety, hallucinations, sleep changes, suicidal ideas     Skin: Negative for mass, pain, itching, rash, ulcer     Endocrine: Negative for heat intolerance, cold intolerance, excessive sweating, polyuria, excess thirst     Hematologic/Lymph: Negative for anemia, bruising, easy bleeding, night sweats, petechiae, history of DVT/PE or cancer     Allergic/Immunologic: Negative for anaphylaxis, itchy/ teary eyes, itching, sneezing, swelling    Physical Exam  Constitutional: Well developed, awake/alert/oriented x3, no distress, alert and cooperative             Eyes: Sclera anicteric, no conjunctival inflammation, conjugate gaze    ENMT: mucous membranes moist, no apparent injury,            Head/Neck: Neck supple, no apparent injury, No JVD, trachea midline, no bruits              Respiratory/Thorax: Patent airways, CTAB, normal breath sounds with good chest expansion, thorax symmetric         Cardiovascular: Regular, rate and rhythm, no murmurs, normal S1 and S2         Gastrointestinal: Small spontaneously reducible umbilical hernia nondistended, soft, non-tender, no rebound tenderness or guarding, no masses palpable, no organomegaly, +BS, no bruits               Extremities: normal extremities, no cyanosis edema, contusions or wounds, 2+ femoral pulses B/L              Neurological: alert and oriented x3, normal  strength, Normal gait          Lymphatic: No palpable inguinal lymphadenopathy   Psychological: Appropriate mood and behavior         Skin: Warm and dry, no lesions, no rashes                Anorectal:      Impression:  Multiple right sided colonic polyps, endoscopically unresectable and concern for underlying invasion on endoscopy    Plan:    Will request initial colonoscopy reports from earlier this year.   Most recent colonoscopy seems like the majority of polyps are right-sided.  Complete metastatic workup, cbc. Cmp, cea, ct CAP  Genetic testing ordered and pending given number of polyps identified -discussed that with this scenario Bailey syndrome would be the most likely and right colectomy is still likely the most reasonable answer given age.  Plan for laparoscopic right colectomy    Diverticulosis seems well-controlled.  No chronic symptoms or issues since 2010.  Would not recommend elective sigmoid colectomy at this time but may rediscuss if genetic testing is truly positive.      Risks and benefits of surgery were discussed with the patient including, but not limited to: conversion to an open procedure, infection, bleeding, wound healing issues, anastomotic leak or stricture, damage to surrounding structures (including the ureters, nerves, and major blood vessels), change in bowel habits, ostomy, incontinence, impotence, risks with anesthesia,  blood clot, heart attack, stroke, COVID infection, and death.

## 2025-04-01 ENCOUNTER — OFFICE VISIT (OUTPATIENT)
Dept: SURGERY | Facility: CLINIC | Age: 65
End: 2025-04-01
Payer: MEDICARE

## 2025-04-01 VITALS
TEMPERATURE: 97.7 F | HEART RATE: 82 BPM | WEIGHT: 189 LBS | OXYGEN SATURATION: 96 % | BODY MASS INDEX: 26.36 KG/M2 | SYSTOLIC BLOOD PRESSURE: 160 MMHG | DIASTOLIC BLOOD PRESSURE: 86 MMHG

## 2025-04-01 DIAGNOSIS — D12.6 ADENOMATOUS POLYP OF COLON, UNSPECIFIED PART OF COLON: ICD-10-CM

## 2025-04-01 DIAGNOSIS — K63.89 COLONIC MASS: ICD-10-CM

## 2025-04-01 DIAGNOSIS — K57.90 DIVERTICULOSIS: Primary | ICD-10-CM

## 2025-04-01 DIAGNOSIS — D12.2 ADENOMATOUS POLYP OF ASCENDING COLON: ICD-10-CM

## 2025-04-01 PROCEDURE — 3079F DIAST BP 80-89 MM HG: CPT | Performed by: SURGERY

## 2025-04-01 PROCEDURE — 99205 OFFICE O/P NEW HI 60 MIN: CPT | Performed by: SURGERY

## 2025-04-01 PROCEDURE — 3077F SYST BP >= 140 MM HG: CPT | Performed by: SURGERY

## 2025-04-01 PROCEDURE — 1036F TOBACCO NON-USER: CPT | Performed by: SURGERY

## 2025-04-01 PROCEDURE — 1125F AMNT PAIN NOTED PAIN PRSNT: CPT | Performed by: SURGERY

## 2025-04-01 PROCEDURE — 99215 OFFICE O/P EST HI 40 MIN: CPT | Performed by: SURGERY

## 2025-04-01 ASSESSMENT — PAIN SCALES - GENERAL: PAINLEVEL_OUTOF10: 7

## 2025-04-01 NOTE — LETTER
April 1, 2025     Dorian Hall DO  [address]  [place] [address]    Patient: Bernardo Eagle   YOB: 1960   Date of Visit: 4/1/2025       Dear Dr. Dorian Hall DO:    Thank you for referring Bernardo Eagle to me for evaluation. Below are my notes for this consultation.  If you have questions, please do not hesitate to call me. I look forward to following your patient along with you.       Sincerely,     Harrison Dash MD      CC: No Recipients  ______________________________________________________________________________________    Austen Eagle is a 64 year old male referred by Dr. Dorian Hall for evaluation of colon polyps.    History of diverticulitis with abscess.  S/P IR drainage of abscess in 2010.  Colonoscopy in 2010 showed diverticulosis and tubular adenomas polyps.  He was supposed to follow up in 2013 but was lost to follow up.    November 2024 (+)Cologaurd.       Has had recurrent or chronic abdominal pain or issues since 2010.  Voids nuts and seeds.    3/06/2025 Colonoscopy to TI:  Findings  Sessile polyp measuring 5-9 mm in the rectum; performed cold forceps biopsy with complete en bloc removal  Polyp measuring 5-9 mm in the cecum; performed cold snare with complete removal  Depressed polyp measuring 20 mm or greater in the cecum; lift performed by injecting a solution into the submucosa; performed cold forceps biopsy  80 mm benign-appearing LST-G polyp in the ascending colon  Ten or more polyps measuring smaller than 5 mm in the hepatic flexure and transverse colon; performed cold snare removal  Small and large diverticula of moderate severity causing moderate luminal narrowing in the sigmoid colon  Pathology:   A.  Rectum polyp:  --Hyperplastic polyp     B.  Colon, cecum, polyp:  --Tubular adenoma, multiple fragments     C.  Colon, cecum, polyp:  --Tubular adenoma, multiple fragments     D.  Transverse colon polyp x 4:  --Tubular adenoma, multiple fragments     March 26,  2025 Genetics appointment.  Results pending.    Visit Vitals  /86   Pulse 82   Temp 36.5 °C (97.7 °F)   Wt 85.7 kg (189 lb)   SpO2 96%   BMI 26.36 kg/m²   Smoking Status Never   BSA 2.07 m²     Past Medical History  HTN  Arthritis  Diverticulitis  Colon polyps    Surgical History  IR drainage of abscess     Social History  Smoking: Marijuana  ETOH:    Family History      Review of Systems  Constitutional: Negative for fever, chills, anorexia, weight loss, malaise     ENMT: Negative for nasal discharge, congestion, ear pain, mouth pain, throat pain     Respiratory: Negative for cough, hemoptysis, wheezing, shortness of breath     Cardiac: Negative for chest pain, dyspnea on exertion, orthopnea, palpitations, syncope, (+)HTN     Gastrointestinal: Negative for nausea, vomiting, diarrhea, constipation, abdominal pain, (+)DIVERTICULITIS, (+)COLON POLYPS    Genitourinary: Negative for discharge, dysuria, flank pain, frequency, hematuria     Musculoskeletal: Negative for decreased ROM, pain, swelling, weakness, (+)ARTHRITIS     Neurological: Negative for dizziness, confusion, headache, seizures, syncope     Psychiatric: Negative for mood changes, anxiety, hallucinations, sleep changes, suicidal ideas     Skin: Negative for mass, pain, itching, rash, ulcer     Endocrine: Negative for heat intolerance, cold intolerance, excessive sweating, polyuria, excess thirst     Hematologic/Lymph: Negative for anemia, bruising, easy bleeding, night sweats, petechiae, history of DVT/PE or cancer     Allergic/Immunologic: Negative for anaphylaxis, itchy/ teary eyes, itching, sneezing, swelling    Physical Exam  Constitutional: Well developed, awake/alert/oriented x3, no distress, alert and cooperative             Eyes: Sclera anicteric, no conjunctival inflammation, conjugate gaze    ENMT: mucous membranes moist, no apparent injury,            Head/Neck: Neck supple, no apparent injury, No JVD, trachea midline, no bruits               Respiratory/Thorax: Patent airways, CTAB, normal breath sounds with good chest expansion, thorax symmetric         Cardiovascular: Regular, rate and rhythm, no murmurs, normal S1 and S2         Gastrointestinal: Small spontaneously reducible umbilical hernia nondistended, soft, non-tender, no rebound tenderness or guarding, no masses palpable, no organomegaly, +BS, no bruits               Extremities: normal extremities, no cyanosis edema, contusions or wounds, 2+ femoral pulses B/L              Neurological: alert and oriented x3, normal strength, Normal gait          Lymphatic: No palpable inguinal lymphadenopathy   Psychological: Appropriate mood and behavior         Skin: Warm and dry, no lesions, no rashes                Anorectal:      Impression:  Multiple right sided colonic polyps, endoscopically unresectable and concern for underlying invasion on endoscopy    Plan:    Will request initial colonoscopy reports from earlier this year.   Most recent colonoscopy seems like the majority of polyps are right-sided.  Complete metastatic workup, cbc. Cmp, cea, ct CAP  Genetic testing ordered and pending given number of polyps identified -discussed that with this scenario Bailey syndrome would be the most likely and right colectomy is still likely the most reasonable answer given age.  Plan for laparoscopic right colectomy    Diverticulosis seems well-controlled.  No chronic symptoms or issues since 2010.  Would not recommend elective sigmoid colectomy at this time but may rediscuss if genetic testing is truly positive.      Risks and benefits of surgery were discussed with the patient including, but not limited to: conversion to an open procedure, infection, bleeding, wound healing issues, anastomotic leak or stricture, damage to surrounding structures (including the ureters, nerves, and major blood vessels), change in bowel habits, ostomy, incontinence, impotence, risks with anesthesia,  blood clot, heart attack,  stroke, COVID infection, and death.

## 2025-04-02 DIAGNOSIS — D12.2 ADENOMATOUS POLYP OF ASCENDING COLON: ICD-10-CM

## 2025-04-02 RX ORDER — CHLORHEXIDINE GLUCONATE ORAL RINSE 1.2 MG/ML
SOLUTION DENTAL
Qty: 120 ML | Refills: 0 | Status: SHIPPED | OUTPATIENT
Start: 2025-04-02

## 2025-04-02 RX ORDER — METRONIDAZOLE 250 MG/1
TABLET ORAL
Qty: 3 TABLET | Refills: 0 | Status: SHIPPED | OUTPATIENT
Start: 2025-04-02

## 2025-04-02 RX ORDER — GABAPENTIN 100 MG/1
CAPSULE ORAL
Qty: 3 CAPSULE | Refills: 0 | Status: SHIPPED | OUTPATIENT
Start: 2025-04-02

## 2025-04-02 RX ORDER — NEOMYCIN SULFATE 500 MG/1
TABLET ORAL
Qty: 6 TABLET | Refills: 0 | Status: SHIPPED | OUTPATIENT
Start: 2025-04-02

## 2025-04-08 ENCOUNTER — TELEPHONE (OUTPATIENT)
Dept: GENETICS | Facility: CLINIC | Age: 65
End: 2025-04-08
Payer: MEDICARE

## 2025-04-08 ENCOUNTER — APPOINTMENT (OUTPATIENT)
Dept: RADIOLOGY | Facility: HOSPITAL | Age: 65
End: 2025-04-08
Payer: MEDICARE

## 2025-04-08 LAB
ANION GAP SERPL CALCULATED.4IONS-SCNC: 11 MMOL/L (CALC) (ref 7–17)
BUN SERPL-MCNC: 11 MG/DL (ref 7–25)
BUN/CREAT SERPL: ABNORMAL (CALC) (ref 6–22)
CALCIUM SERPL-MCNC: 9.7 MG/DL (ref 8.6–10.3)
CHLORIDE SERPL-SCNC: 105 MMOL/L (ref 98–110)
CO2 SERPL-SCNC: 24 MMOL/L (ref 20–32)
COMMENTS - MP RESULT TYPE: NORMAL
CREAT SERPL-MCNC: 0.82 MG/DL (ref 0.7–1.35)
EGFRCR SERPLBLD CKD-EPI 2021: 97 ML/MIN/1.73M2
ERYTHROCYTE [DISTWIDTH] IN BLOOD BY AUTOMATED COUNT: 12 % (ref 11–15)
GLUCOSE SERPL-MCNC: 111 MG/DL (ref 65–99)
HCT VFR BLD AUTO: 46.9 % (ref 38.5–50)
HGB BLD-MCNC: 15.8 G/DL (ref 13.2–17.1)
MCH RBC QN AUTO: 32.2 PG (ref 27–33)
MCHC RBC AUTO-ENTMCNC: 33.7 G/DL (ref 32–36)
MCV RBC AUTO: 95.5 FL (ref 80–100)
PLATELET # BLD AUTO: 273 THOUSAND/UL (ref 140–400)
PMV BLD REES-ECKER: 9.4 FL (ref 7.5–12.5)
POTASSIUM SERPL-SCNC: 4.3 MMOL/L (ref 3.5–5.3)
RBC # BLD AUTO: 4.91 MILLION/UL (ref 4.2–5.8)
SCAN RESULT: NORMAL
SODIUM SERPL-SCNC: 140 MMOL/L (ref 135–146)
WBC # BLD AUTO: 7.2 THOUSAND/UL (ref 3.8–10.8)

## 2025-04-14 ENCOUNTER — HOSPITAL ENCOUNTER (OUTPATIENT)
Dept: RADIOLOGY | Facility: HOSPITAL | Age: 65
Discharge: HOME | End: 2025-04-14
Payer: MEDICARE

## 2025-04-14 DIAGNOSIS — D12.2 ADENOMATOUS POLYP OF ASCENDING COLON: ICD-10-CM

## 2025-04-14 PROCEDURE — 74177 CT ABD & PELVIS W/CONTRAST: CPT

## 2025-04-14 PROCEDURE — 2550000001 HC RX 255 CONTRASTS: Performed by: SURGERY

## 2025-04-14 RX ADMIN — IOHEXOL 75 ML: 350 INJECTION, SOLUTION INTRAVENOUS at 12:17

## 2025-04-17 DIAGNOSIS — I10 PRIMARY HYPERTENSION: ICD-10-CM

## 2025-04-17 RX ORDER — AMLODIPINE BESYLATE 10 MG/1
10 TABLET ORAL DAILY
Qty: 90 TABLET | Refills: 3 | Status: SHIPPED | OUTPATIENT
Start: 2025-04-17

## 2025-04-17 RX ORDER — BENAZEPRIL HYDROCHLORIDE 40 MG/1
40 TABLET ORAL DAILY
Qty: 90 TABLET | Refills: 3 | Status: SHIPPED | OUTPATIENT
Start: 2025-04-17

## 2025-04-17 NOTE — TELEPHONE ENCOUNTER
Patient's wife called to return my earlier voicemail. Informed me that all the patient's colonoscopies had been performed by Dr. Harley and that one of his other doctors had submitted an ROMELIA. She was unsure if this was for his most recent colonoscopy in 2025, or if it would include the procedure from 2010.

## 2025-04-17 NOTE — TELEPHONE ENCOUNTER
Called and left message for patient on behalf of [facility]. Requested that he call back at my direct line to answer questions regarding where he had a past colonoscopy.

## 2025-04-23 ENCOUNTER — CLINICAL SUPPORT (OUTPATIENT)
Dept: PREADMISSION TESTING | Facility: HOSPITAL | Age: 65
End: 2025-04-23
Payer: MEDICARE

## 2025-04-23 DIAGNOSIS — D12.2 ADENOMATOUS POLYP OF ASCENDING COLON: ICD-10-CM

## 2025-04-23 RX ORDER — NICOTINE POLACRILEX 2 MG
1 GUM BUCCAL DAILY
COMMUNITY

## 2025-04-23 NOTE — CPM/PAT NURSE NOTE
"CPM/PAT Nurse Note      Name: Austen Eagle (Austen Eagle \"Bernardo\")  /Age: 1960/64 y.o.       Medical History[1]    Surgical History[2]    Patient Sexual activity questions deferred to the physician.    Family History[3]    Allergies[4]    Prior to Admission medications    Medication Sig Start Date End Date Taking? Authorizing Provider   amLODIPine (Norvasc) 10 mg tablet TAKE 1 TABLET BY MOUTH EVERY DAY 25   Dorian Mckeon MD   benazepril (Lotensin) 40 mg tablet TAKE 1 TABLET BY MOUTH EVERY DAY 25   Dorian Mckeon MD   biotin 1 mg capsule Take 1 capsule (1 mg) by mouth once daily.    Historical Provider, MD   chlorhexidine (Peridex) 0.12 % solution Rinse mouth with 15 ml after toothbrushing the night before surgery and on the morning of surgery.  Expectorate after rinsing.  Do not swallow. 25   Harrison Dash MD   cholecalciferol (Vitamin D-3) 125 mcg (5000 UT) capsule Take 1 capsule (125 mcg) by mouth once daily. 24   Dorian Mckeon MD   gabapentin (Neurontin) 100 mg capsule Take one capsule by mouth starting three days before surgery at bedtime. 25   Harrison Dash MD   glucosamine/chondr sloan A sod (OSTEO BI-FLEX ORAL) Take by mouth.    Historical Provider, MD   metroNIDAZOLE (Flagyl) 250 mg tablet Take one tablet at 6p, 7p, and 11p the night before surgery. 25   Harrison Dash MD   multivitamin tablet Take 1 tablet by mouth once daily. 3/26/24   Dorian Mckeon MD   neomycin (Mycifradin) 500 mg tablet Take two tabs by mouth at 6p, 7pm, and 11p the night before surgery. 25   Harrison Dash MD   psyllium (Metamucil) 3.4 gram packet once every 24 hours.    Historical Provider, MD   vit A/vit C/vit E/zinc/copper (ICAPS AREDS ORAL) Take 1 tablet by mouth 2 times a day.    Historical Provider, MD   amLODIPine (Norvasc) 10 mg tablet Take 1 tablet (10 mg) by mouth once daily. 3/26/24 4/17/25  Dorian Mckeon MD   benazepril (Lotensin) 40 mg tablet Take 1 tablet " (40 mg) by mouth once daily. 3/26/24 4/17/25  Dorian Mckeon MD   sodium,potassium,mag sulfates (Suprep) 17.5-3.13-1.6 gram solution Take 1 bottle by mouth see administration instructions. 3/3/25 4/23/25  DO CHRIS Ellis ROS     DASI Risk Score      Flowsheet Row Questionnaire Series Submission from 4/2/2025 in [facility] with Generic Provider Shanae   Can you take care of yourself (eat, dress, bathe, or use toilet)?  2.75  filed at 04/02/2025 0851   Can you walk indoors, such as around your house? 1.75  filed at 04/02/2025 0851   Can you walk a block or two on level ground?  2.75  filed at 04/02/2025 0851   Can you climb a flight of stairs or walk up a hill? 5.5  filed at 04/02/2025 0851   Can you run a short distance? 0  filed at 04/02/2025 0851   Can you do light work around the house like dusting or washing dishes? 2.7  filed at 04/02/2025 0851   Can you do moderate work around the house like vacuuming, sweeping floors or carrying groceries? 3.5  filed at 04/02/2025 0851   Can you do heavy work around the house like scrubbing floors or lifting and moving heavy furniture?  8  filed at 04/02/2025 0851   Can you do yard work like raking leaves, weeding or pushing a mower? 4.5  filed at 04/02/2025 0851   Can you have sexual relations? 5.25  filed at 04/02/2025 0851   Can you participate in moderate recreational activities like golf, bowling, dancing, doubles tennis or throwing a baseball or football? 6  filed at 04/02/2025 0851   Can you participate in strenous sports like swimming, singles tennis, football, basketball, or skiing? 7.5  filed at 04/02/2025 0851   DASI SCORE 50.2  filed at 04/02/2025 0851   METS Score (Will be calculated only when all the questions are answered) 8.9  filed at 04/02/2025 0851          Caprini DVT Assessment    No data to display       Modified Frailty Index    No data to display       [id number] Stroke Risk Points  Current as of just now        N/A 0 to 9 Points:       Last Change: N/A          The NMX2XR9-SOLu risk score (Lip GH, et al. 2009. © 2010 [employer]) quantifies the risk of stroke for a patient with atrial fibrillation. For patients without atrial fibrillation or under the age of 18 this score appears as N/A. Higher score values generally indicate higher risk of stroke.        This score is not applicable to this patient. Components are not calculated.          Revised Cardiac Risk Index    No data to display       Apfel Simplified Score    No data to display       Risk Analysis Index Results This Encounter    No data found in the last 10 encounters.       Stop Bang Score      Flowsheet Row Questionnaire Series Submission from 4/2/2025 in [facility] with Generic Provider Shanae   Do you snore loudly? 0  filed at 04/02/2025 0851   Do you often feel tired or fatigued after your sleep? 0  filed at 04/02/2025 0851   Has anyone ever observed you stop breathing in your sleep? 0  filed at 04/02/2025 0851   Do you have or are you being treated for high blood pressure? 1  filed at 04/02/2025 0851   Recent BMI (Calculated) 26.5  filed at 04/02/2025 0851   Is BMI greater than 35 kg/m2? 0=No  filed at 04/02/2025 0851   Age older than 50 years old? 1=Yes  filed at 04/02/2025 0851   Gender - Male 1=Yes  filed at 04/02/2025 0851          Prodigy: High Risk  Total Score: 16              Prodigy Age Score      Prodigy Gender Score          ARISCAT Score for Postoperative Pulmonary Complications    No data to display       Cox Perioperative Risk for Myocardial Infarction or Cardiac Arrest (MAME)    No data to display         Nurse Plan of Action:     RN screening call complete.  Reviewed allergies, medications and pharmacy, medical, surgical and social history with patient.  Chart updated.             [1]   Past Medical History:  Diagnosis Date    Adenomatous polyp of ascending colon     Colon polyp 2010    Diverticulitis of colon 2010    Diverticulosis  6/2010    Gout     History of heavy alcohol consumption     has cut back recently (3-4 beers/day now, previously 8/day)    Hyperlipidemia     Hypertension 2010    Lumbar radiculopathy     PAC (premature atrial contraction)     Positive colorectal cancer screening using Cologuard test     11/2024   [2]   Past Surgical History:  Procedure Laterality Date    ABSCESS DRAINAGE  2010    IR drainage of diverticular abscess    COLONOSCOPY     [3]   Family History  Problem Relation Name Age of Onset    Diabetes Mother Robyn Mathews     No Known Problems Half-Brother     [4]   Allergies  Allergen Reactions    Aspirin Hives    Hay Fever And Allergy Relief Runny nose    Ibuprofen Hives    Pollen Extracts Runny nose

## 2025-04-29 ENCOUNTER — TELEPHONE (OUTPATIENT)
Dept: SURGERY | Facility: CLINIC | Age: 65
End: 2025-04-29
Payer: MEDICARE

## 2025-04-29 NOTE — TELEPHONE ENCOUNTER
Attempted to reach patient to change date of upcoming surgery with Dr. Dash.  Message left inviting him to please contact me at your earliest convenience at [phone].  Ruth Shen RN

## 2025-04-30 ENCOUNTER — APPOINTMENT (OUTPATIENT)
Dept: PREADMISSION TESTING | Facility: HOSPITAL | Age: 65
End: 2025-04-30
Payer: MEDICARE

## 2025-05-07 ENCOUNTER — APPOINTMENT (OUTPATIENT)
Dept: PREADMISSION TESTING | Facility: HOSPITAL | Age: 65
End: 2025-05-07
Payer: MEDICARE

## 2025-05-07 LAB
25(OH)D3+25(OH)D2 SERPL-MCNC: 35 NG/ML (ref 30–100)
ALBUMIN SERPL-MCNC: 4.4 G/DL (ref 3.6–5.1)
ALBUMIN/GLOB SERPL: 1.7 (CALC) (ref 1–2.5)
ALP SERPL-CCNC: 63 U/L (ref 35–144)
ALT SERPL-CCNC: 35 U/L (ref 9–46)
ANION GAP SERPL CALCULATED.4IONS-SCNC: 10 MMOL/L (CALC) (ref 7–17)
AST SERPL-CCNC: 28 U/L (ref 10–35)
BASOPHILS # BLD AUTO: 40 CELLS/UL (ref 0–200)
BASOPHILS NFR BLD AUTO: 0.5 %
BILIRUB DIRECT SERPL-MCNC: 0.1 MG/DL
BILIRUB INDIRECT SERPL-MCNC: 0.3 MG/DL (CALC) (ref 0.2–1.2)
BILIRUB SERPL-MCNC: 0.4 MG/DL (ref 0.2–1.2)
BUN SERPL-MCNC: 12 MG/DL (ref 7–25)
BUN/CREAT SERPL: ABNORMAL (CALC) (ref 6–22)
CALCIUM SERPL-MCNC: 9.3 MG/DL (ref 8.6–10.3)
CHLORIDE SERPL-SCNC: 105 MMOL/L (ref 98–110)
CHOLEST SERPL-MCNC: 237 MG/DL
CHOLEST/HDLC SERPL: 3 (CALC)
CO2 SERPL-SCNC: 25 MMOL/L (ref 20–32)
CREAT SERPL-MCNC: 0.79 MG/DL (ref 0.7–1.35)
EGFRCR SERPLBLD CKD-EPI 2021: 99 ML/MIN/1.73M2
EOSINOPHIL # BLD AUTO: 320 CELLS/UL (ref 15–500)
EOSINOPHIL NFR BLD AUTO: 4 %
ERYTHROCYTE [DISTWIDTH] IN BLOOD BY AUTOMATED COUNT: 12.4 % (ref 11–15)
EST. AVERAGE GLUCOSE BLD GHB EST-MCNC: 111 MG/DL
EST. AVERAGE GLUCOSE BLD GHB EST-SCNC: 6.2 MMOL/L
GLOBULIN SER CALC-MCNC: 2.6 G/DL (CALC) (ref 1.9–3.7)
GLUCOSE SERPL-MCNC: 101 MG/DL (ref 65–99)
HBA1C MFR BLD: 5.5 %
HCT VFR BLD AUTO: 47.2 % (ref 38.5–50)
HDLC SERPL-MCNC: 78 MG/DL
HGB BLD-MCNC: 16 G/DL (ref 13.2–17.1)
LDLC SERPL CALC-MCNC: 127 MG/DL (CALC)
LYMPHOCYTES # BLD AUTO: 2816 CELLS/UL (ref 850–3900)
LYMPHOCYTES NFR BLD AUTO: 35.2 %
MCH RBC QN AUTO: 32.3 PG (ref 27–33)
MCHC RBC AUTO-ENTMCNC: 33.9 G/DL (ref 32–36)
MCV RBC AUTO: 95.2 FL (ref 80–100)
MONOCYTES # BLD AUTO: 632 CELLS/UL (ref 200–950)
MONOCYTES NFR BLD AUTO: 7.9 %
NEUTROPHILS # BLD AUTO: 4192 CELLS/UL (ref 1500–7800)
NEUTROPHILS NFR BLD AUTO: 52.4 %
NONHDLC SERPL-MCNC: 159 MG/DL (CALC)
PLATELET # BLD AUTO: 296 THOUSAND/UL (ref 140–400)
PMV BLD REES-ECKER: 9.8 FL (ref 7.5–12.5)
POTASSIUM SERPL-SCNC: 4.5 MMOL/L (ref 3.5–5.3)
PROT SERPL-MCNC: 7 G/DL (ref 6.1–8.1)
PSA SERPL-MCNC: 0.35 NG/ML
RBC # BLD AUTO: 4.96 MILLION/UL (ref 4.2–5.8)
SODIUM SERPL-SCNC: 140 MMOL/L (ref 135–146)
TRIGL SERPL-MCNC: 196 MG/DL
TSH SERPL-ACNC: 1.27 MIU/L (ref 0.4–4.5)
URATE SERPL-MCNC: 7.4 MG/DL (ref 4–8)
WBC # BLD AUTO: 8 THOUSAND/UL (ref 3.8–10.8)

## 2025-05-13 ENCOUNTER — APPOINTMENT (OUTPATIENT)
Dept: PRIMARY CARE | Facility: CLINIC | Age: 65
End: 2025-05-13
Payer: MEDICARE

## 2025-05-14 ENCOUNTER — OFFICE VISIT (OUTPATIENT)
Dept: PRIMARY CARE | Facility: CLINIC | Age: 65
End: 2025-05-14
Payer: MEDICARE

## 2025-05-14 VITALS
DIASTOLIC BLOOD PRESSURE: 80 MMHG | WEIGHT: 187 LBS | SYSTOLIC BLOOD PRESSURE: 132 MMHG | HEART RATE: 83 BPM | BODY MASS INDEX: 26.18 KG/M2 | OXYGEN SATURATION: 95 % | HEIGHT: 71 IN

## 2025-05-14 DIAGNOSIS — E78.2 MIXED HYPERLIPIDEMIA: ICD-10-CM

## 2025-05-14 DIAGNOSIS — I10 PRIMARY HYPERTENSION: ICD-10-CM

## 2025-05-14 DIAGNOSIS — Z87.39 HISTORY OF GOUT: ICD-10-CM

## 2025-05-14 DIAGNOSIS — Z00.00 ANNUAL PHYSICAL EXAM: ICD-10-CM

## 2025-05-14 DIAGNOSIS — Z01.89 ENCOUNTER FOR ROUTINE LABORATORY TESTING: ICD-10-CM

## 2025-05-14 DIAGNOSIS — E55.9 VITAMIN D DEFICIENCY: ICD-10-CM

## 2025-05-14 DIAGNOSIS — M15.0 PRIMARY OSTEOARTHRITIS INVOLVING MULTIPLE JOINTS: Primary | ICD-10-CM

## 2025-05-14 PROBLEM — R78.2: Status: RESOLVED | Noted: 2023-03-20 | Resolved: 2025-05-14

## 2025-05-14 PROCEDURE — G2211 COMPLEX E/M VISIT ADD ON: HCPCS | Performed by: STUDENT IN AN ORGANIZED HEALTH CARE EDUCATION/TRAINING PROGRAM

## 2025-05-14 PROCEDURE — 3075F SYST BP GE 130 - 139MM HG: CPT | Performed by: STUDENT IN AN ORGANIZED HEALTH CARE EDUCATION/TRAINING PROGRAM

## 2025-05-14 PROCEDURE — 1036F TOBACCO NON-USER: CPT | Performed by: STUDENT IN AN ORGANIZED HEALTH CARE EDUCATION/TRAINING PROGRAM

## 2025-05-14 PROCEDURE — 1159F MED LIST DOCD IN RCRD: CPT | Performed by: STUDENT IN AN ORGANIZED HEALTH CARE EDUCATION/TRAINING PROGRAM

## 2025-05-14 PROCEDURE — 3008F BODY MASS INDEX DOCD: CPT | Performed by: STUDENT IN AN ORGANIZED HEALTH CARE EDUCATION/TRAINING PROGRAM

## 2025-05-14 PROCEDURE — 99214 OFFICE O/P EST MOD 30 MIN: CPT | Performed by: STUDENT IN AN ORGANIZED HEALTH CARE EDUCATION/TRAINING PROGRAM

## 2025-05-14 PROCEDURE — 1126F AMNT PAIN NOTED NONE PRSNT: CPT | Performed by: STUDENT IN AN ORGANIZED HEALTH CARE EDUCATION/TRAINING PROGRAM

## 2025-05-14 PROCEDURE — 1160F RVW MEDS BY RX/DR IN RCRD: CPT | Performed by: STUDENT IN AN ORGANIZED HEALTH CARE EDUCATION/TRAINING PROGRAM

## 2025-05-14 PROCEDURE — 3079F DIAST BP 80-89 MM HG: CPT | Performed by: STUDENT IN AN ORGANIZED HEALTH CARE EDUCATION/TRAINING PROGRAM

## 2025-05-14 ASSESSMENT — ENCOUNTER SYMPTOMS
GASTROINTESTINAL NEGATIVE: 1
CARDIOVASCULAR NEGATIVE: 1
CONSTITUTIONAL NEGATIVE: 1
RESPIRATORY NEGATIVE: 1

## 2025-05-14 ASSESSMENT — PAIN SCALES - GENERAL: PAINLEVEL_OUTOF10: 0-NO PAIN

## 2025-05-14 NOTE — PROGRESS NOTES
[facility]: [facility]  PROGRESS NOTE      Austen Eagle is a 64 y.o. male that is presenting today for Follow-up.    Assessment/Plan   - Overall, the patient feels well and denies any acute symptoms / concerns at this time.  - Blood pressure at goal today.  - Encouraged continued dietary, exercise, and lifestyle modification.  - Discussed the benefits associated with alcohol cessation.  - Significant medication and problem list reconciliation done today.     - Labwork:   - Patient had labwork done for this appointment. Discussed today.    - Cholesterol remains abnormal, but stable.   - Remainder of labwork unremarkable.  - Will order labwork for the patient's next appointment. Encouraged the patient to get this labwork done one week prior to the next appointment.    Diagnoses and all orders for this visit:  Primary osteoarthritis involving multiple joints  Mixed hyperlipidemia  Primary hypertension  -     Comprehensive Metabolic Panel; Future  -     CBC and Auto Differential; Future  Vitamin D deficiency  History of gout  Encounter for routine laboratory testing  Annual physical exam  -     Follow Up In Primary Care; Future    Current Outpatient Medications   Medication Instructions    amLODIPine (NORVASC) 10 mg, oral, Daily    benazepril (LOTENSIN) 40 mg, oral, Daily    biotin 1 mg capsule 1 capsule, Daily    chlorhexidine (Peridex) 0.12 % solution Rinse mouth with 15 ml after toothbrushing the night before surgery and on the morning of surgery.  Expectorate after rinsing.  Do not swallow.    cholecalciferol (VITAMIN D-3) 125 mcg, oral, Daily    gabapentin (Neurontin) 100 mg capsule Take one capsule by mouth starting three days before surgery at bedtime.    glucosamine/chondr sloan A sod (OSTEO BI-FLEX ORAL) Take by mouth.    metroNIDAZOLE (Flagyl) 250 mg tablet Take one tablet at 6p, 7p, and 11p the night before surgery.    multivitamin tablet 1 tablet, oral, Daily    neomycin (Mycifradin)  500 mg tablet Take two tabs by mouth at 6p, 7pm, and 11p the night before surgery.    psyllium (Metamucil) 3.4 gram packet Every 24 hours    vit A/vit C/vit E/zinc/copper (ICAPS AREDS ORAL) 1 tablet, 2 times daily     Subjective   - The patient otherwise feels well and denies any acute symptoms or concerns at this time.  - The patient denies any changes or progression of their chronic medical problems.  - The patient denies any problems or concerns with their medications.      Review of Systems   Constitutional: Negative.    Respiratory: Negative.     Cardiovascular: Negative.    Gastrointestinal: Negative.    All other systems reviewed and are negative.     Objective   Vitals:    05/14/25 1029   BP: 132/80   Pulse: 83   SpO2: 95%      Body mass index is 26.09 kg/m².  Physical Exam  Vitals and nursing note reviewed.   Constitutional:       General: He is not in acute distress.  Neck:      Vascular: No carotid bruit.   Cardiovascular:      Rate and Rhythm: Normal rate and regular rhythm.      Heart sounds: Normal heart sounds.   Pulmonary:      Effort: Pulmonary effort is normal.      Breath sounds: Normal breath sounds.   Musculoskeletal:         General: No swelling.   Neurological:      Mental Status: He is alert. Mental status is at baseline.   Psychiatric:         Mood and Affect: Mood normal.       Diagnostic Results   Lab Results   Component Value Date    GLUCOSE 101 (H) 05/06/2025    CALCIUM 9.3 05/06/2025     05/06/2025    K 4.5 05/06/2025    CO2 25 05/06/2025     05/06/2025    BUN 12 05/06/2025    CREATININE 0.79 05/06/2025     Lab Results   Component Value Date    ALT 35 05/06/2025    AST 28 05/06/2025    ALKPHOS 63 05/06/2025    BILITOT 0.4 05/06/2025     Lab Results   Component Value Date    WBC 8.0 05/06/2025    HGB 16.0 05/06/2025    HCT 47.2 05/06/2025    MCV 95.2 05/06/2025     05/06/2025     Lab Results   Component Value Date    CHOL 237 (H) 05/06/2025    CHOL 243 (H) 03/20/2024  "   CHOL 223 (H) 03/20/2023     Lab Results   Component Value Date    HDL 78 05/06/2025    HDL 77.0 03/20/2024    HDL 77 03/20/2023     Lab Results   Component Value Date    LDLCALC 127 (H) 05/06/2025    LDLCALC 135 (H) 03/20/2024    LDLCALC 123 03/20/2023     Lab Results   Component Value Date    TRIG 196 (H) 05/06/2025    TRIG 154 (H) 03/20/2024    TRIG 114 03/20/2023     No components found for: \"CHOLHDL\"  Lab Results   Component Value Date    HGBA1C 5.5 05/06/2025     Other labs not included in the list above were reviewed either before or during this encounter.    History    Medical History[1]  Surgical History[2]  Family History[3]  Social History     Socioeconomic History    Marital status:      Spouse name: Not on file    Number of children: Not on file    Years of education: Not on file    Highest education level: Not on file   Occupational History    Not on file   Tobacco Use    Smoking status: Never    Smokeless tobacco: Never   Vaping Use    Vaping status: Never Used   Substance and Sexual Activity    Alcohol use: Yes     Alcohol/week: 21.0 - 28.0 standard drinks of alcohol     Types: 21 - 28 Cans of beer per week     Comment: history of heavy drinking - now cut in half    Drug use: Yes     Frequency: 7.0 times per week     Types: Marijuana     Comment: pt denies history of other drug use    Sexual activity: Defer     Partners: Female     Birth control/protection: None   Other Topics Concern    Not on file   Social History Narrative    Not on file     Social Drivers of Health     Financial Resource Strain: Not on file   Food Insecurity: Not on file   Transportation Needs: Not on file   Physical Activity: Not on file   Stress: Not on file   Social Connections: Not on file   Intimate Partner Violence: Not on file   Housing Stability: Not on file     Allergies[4]  Medications Ordered Prior to Encounter[5]  Immunization History   Administered Date(s) Administered    Pneumococcal conjugate vaccine, " 20-valent (PREVNAR 20) 11/08/2024     Patient's medical history was reviewed and updated either before or during this encounter.       Dorian Mckeon MD       [1]   Past Medical History:  Diagnosis Date    Adenomatous polyp of ascending colon     Colon polyp 2010    Diverticulitis of colon 2010    Diverticulosis 6/2010    Gout     History of heavy alcohol consumption     has cut back recently (3-4 beers/day now, previously 8/day)    Hyperlipidemia     Hypertension 2010    Lumbar radiculopathy     PAC (premature atrial contraction)     Positive colorectal cancer screening using Cologuard test     11/2024   [2]   Past Surgical History:  Procedure Laterality Date    ABSCESS DRAINAGE  2010    IR drainage of diverticular abscess    COLONOSCOPY     [3]   Family History  Problem Relation Name Age of Onset    Diabetes Mother Robyn Mathews     No Known Problems Half-Brother     [4]   Allergies  Allergen Reactions    Aspirin Hives    Hay Fever And Allergy Relief Runny nose    Ibuprofen Hives    Pollen Extracts Runny nose   [5]   Current Outpatient Medications on File Prior to Visit   Medication Sig Dispense Refill    amLODIPine (Norvasc) 10 mg tablet TAKE 1 TABLET BY MOUTH EVERY DAY 90 tablet 3    benazepril (Lotensin) 40 mg tablet TAKE 1 TABLET BY MOUTH EVERY DAY 90 tablet 3    biotin 1 mg capsule Take 1 capsule (1 mg) by mouth once daily.      chlorhexidine (Peridex) 0.12 % solution Rinse mouth with 15 ml after toothbrushing the night before surgery and on the morning of surgery.  Expectorate after rinsing.  Do not swallow. 120 mL 0    cholecalciferol (Vitamin D-3) 125 mcg (5000 UT) capsule Take 1 capsule (125 mcg) by mouth once daily.      gabapentin (Neurontin) 100 mg capsule Take one capsule by mouth starting three days before surgery at bedtime. 3 capsule 0    glucosamine/chondr sloan A sod (OSTEO BI-FLEX ORAL) Take by mouth.      metroNIDAZOLE (Flagyl) 250 mg tablet Take one tablet at 6p, 7p, and 11p the night before  surgery. 3 tablet 0    multivitamin tablet Take 1 tablet by mouth once daily.      neomycin (Mycifradin) 500 mg tablet Take two tabs by mouth at 6p, 7pm, and 11p the night before surgery. 6 tablet 0    psyllium (Metamucil) 3.4 gram packet once every 24 hours.      vit A/vit C/vit E/zinc/copper (ICAPS AREDS ORAL) Take 1 tablet by mouth 2 times a day.       No current facility-administered medications on file prior to visit.

## 2025-05-20 NOTE — PROGRESS NOTES
Genetic counseling follow-up visit:  Mr. Eagle is a 64 y.o. male presenting today for virtual follow-up in the [facility].  During his prior visit to the  [facility], he chose to pursue hereditary cancer testing via the [facility] Insight panel.  Mr. Eagle results returned, and were entirely NEGATIVE (normal). They were reviewed with Mr. Eagle today at the time of his visit. Austen Eagle was provided a copy of his test report. Austen was accompanied by his wife, Ludy, for the duration of the appointment.     Family History: as previously reported, no changes  A 4-generation pedigree was obtained and was significant for the following:     - Patient, with a personal history of colon polyps (outlined below).    Colonoscopy 3/6/25:   Impression  10 or more polyps removed from the right and transverse colon ranging in size from 2 mm to 5 mm.    Polyp measuring 20 mm or greater in the cecum; lift performed however degree of bleeding with injection needle suggested possible invasion; performed cold forceps biopsy.  80 mm LST-G polyp in the ascending colon with indistinct margins not removed.    Diverticulosis of moderate severity causing moderate luminal narrowing in the sigmoid colon.  Findings  Sessile polyp measuring 5-9 mm in the rectum; performed cold forceps biopsy with complete en bloc removal  Polyp measuring 5-9 mm in the cecum; performed cold snare with complete removal -  Depressed polyp measuring 20 mm or greater in the cecum; lift performed by injecting a solution into the submucosa; performed cold forceps biopsy -  80 mm benign-appearing LST-G polyp in the ascending colon   Ten or more polyps measuring smaller than 5 mm in the hepatic flexure and transverse colon; performed cold snare removal -  Small and large diverticula of moderate severity causing moderate luminal narrowing in the sigmoid colon  FINAL DIAGNOSIS   A.  Rectum polyp:  --Hyperplastic polyp     B.   Colon, cecum, polyp:  --Tubular adenoma, multiple fragments     C.  Colon, cecum, polyp:  --Tubular adenoma, multiple fragments     D.  Transverse colon polyp x 4:  --Tubular adenoma, multiple fragments      Electronically signed by Austen Vazquez MD on 3/13/2025 at 0901 EDT        By the signature on this report, the individual or group listed as making the Final Interpretation/Diagnosis certifies that they have reviewed this case.    Clinical History    Colonoscopy: Sessile polyp measuring 5-9 mm in the rectum; performed cold forceps biopsy with complete en bloc removal  Polyp measuring 5-9 mm in the cecum; performed cold snare with complete removal  Depressed polyp measuring 20 mm or greater in the cecum; lift performed by injecting a solution into the submucosa; performed cold forceps biopsy  80 mm benign-appearing LST-G polyp  Ten or more polyps measuring smaller than 5 mm in the hepatic flexure and transverse colon; performed cold snare removal  Small and large diverticula of moderate severity causing moderate luminal narrowing in the sigmoid colon      Colonoscopy January 2025:    Colonoscopy 1/15/2025 performed by Dr. Gray Harley  ·       Diverticulosis in the sigmoid colon  ·       Internal non-bleeding hemorrhoids  ·       The examined portion of the ileum was normal  ·       One 15 mm polyp in the cecum with depressed center. Biopsied.    ·       Two 4 to 7 mm polyps in the proximal ascending colon, removed.   ·       One 40 mm polyp in the mid ascending colon. Biopsied.   ·       One 12 mm polyp in the mid ascending colon, removed.   ·       Two 3 mm polyps in the distal ascending colon, removed.  ·       Two 4 to 10 mm polyps at the hepatic flexure removed.  ·       Four 6 mm polyps in the transverse colon, in the descending colon and in the mid rectum, removed.      Surgical Pathology Report 1/16/2025:  ·       Cecal Colon Lesion Biopsy: Fragments of TA  ·       Proximal Ascending Colon polyp x  2: TA  ·       Proximal Ascending Colon Biopsy: Fragments of TA  ·       Mid Ascending Colon Polyp: TA  ·       Distal Ascending Colon Polyp Biopsy x 2: TA  ·       Hepatic Flexure Colon Polyp x 2: Fragments of/TVA  ·       Transverse Colon Polyp: TA  ·       Descending Colon Polyp: TVA  ·       Rectal Colon Polyp: TA    Colonoscopy in 2010 - reportedly had tubular adenomas, patient estimates to be about 4 total. Requested requested but have not been made available.    This puts his total confirmed tubular adenoma count at at least 17, but this may exceed 20 depending on his colonoscopy in 2010.     - Maternal cousin (living, 60's) reportedly had to have part of his colon removed in his 40's due to colon polyps  - Some maternal uncles may have had colon polyps, but Austen was uncertain and explained he did not have much information about his family history.     Maternal ancestry is [place]. Paternal ancestry is [place]. There is no known Ashkenazi Christianity ancestry. Consanguinity was denied.     Test Results:  Please refer to the genetic test report from [facility] scanned under Media, attached to this encounter. Results are summarized here.     Test Performed: [facility] panel which looks at the following genes: AIP, ALK, APC, FAISAL, AXIN2, BAP1, BARD1, BMPR1A, BRCA1, BRCA2, BRIP1, CDC73, CDH1, CDK4, CDKN1B, CDKN2A, CEBPA, CHEK2, CTNNA1, DDX41, DICER1, EGFR, EPCAM, ETV6, FH, FLCN, GATA2, GREM1, HOXB13, KIT, LZTR1, MAX, MBD4, MEN1, MET, MITF, MLH1, MLH3, MSH2, MSH3, MSH6, MUTYH, NF1, NF2, NTHL1, PALB2, PDGFRA, PHOX2B, PMS2, POLD1, POLE, POT1, BJRRL3P, PTCH1, PTEN, RAD51C, RAD51D, RB1, RET, RNF43, RPS20, RUNX1, SDHA, SDHAF2, SDHB, SDHC, SDHD, SMAD4, SMARCA4, SMARCB1, SMARCE1, STK11, SUFU, YAFL220, TP53, TSC1, TSC2, VHL, WT1.     Results: No clinically significant variants detected.          DISCUSSION:    Mr. Eagle's results were entirely NEGATIVE for any clinically actionable variants, meaning that  no disease causing mutations were identified. A genetic cause for Austen Eagle's personal history of colon polyps has not been identified. Austen Eagle's results could be negative for several reasons:  Austen Valdess colon polyps  may not be due to a single-gene cause. The vast majority of cancer/polyps are sporadic or familial.  There could be a gene mutation in one of Austen Eagle's family members (Such as his cousin) that he did not inherit.  There could be a mutation in one of the tested genes that cannot be found with current testing methods.  There could be a mutation in a gene that has not yet been linked to hereditary cancer or was not tested.    Colon Polyp Risks    One reason Mr. Eagle underwent genetic testing was to find a cause for the polyps and to better understand his risk to develop colon cancer. Because his results were negative, Mr. Eagle does not have an identifiable genetic risk factor that predisposes to colon polyps/cancer. He should continue to follow the recommendations of his gastroenterologist regarding ongoing surveillance/colonoscopies. He scheduled for a laparoscopic Right Colectomy with Ileocolic Anastomosis on 07/23 with Dr. Dash.    In light of his negative genetic test results, we reviewed current NCCN guidelines for colonic adenomatous polyposis of unknown etiology (CPUE), which is defined as an individual with greater than or equal to 10-20 cumulative adenomas without a pathogenic variant identified in a polyposis gene (including APC, GREM1, POLE, POLD1, AXIN2, NTHL1, MUTYH, MBD4, MLH3, and MSH3) - all of which Austen tested negative for.    NCCN recommendations are summarized below and should be discussed with managing physicians:   For a personal history of 10-19 adenomas:  Manage based on clinical judgment. Frequency of surveillance may be modified based on factors such as age at which patient met cumulative adenoma threshold or total number of adenomas at most recent  colonoscopy, with more frequent surveillance favored for younger age at meeting threshold or higher adenoma burden at last colonoscopy  Consider baseline upper endoscopy (including complete visualization of the ampulla of Vaterd) at time of next colonoscopy surveillance by age 20-25 y and repeat based on findings  For a personal history of 20-<100 adenomas with an adenoma burden manageable by colonoscopy and polypectomy, colonoscopy with polypectomy should take place every 1-2 years.   Repeat at short interval based on residual polyp burden  A baseline upper endoscopy (including complete visualization of the ampulla of Vater) at time of next colonoscopy surveillance by age 20-25 and repeat following duodenal surveillance guidelines  Surgical evaluation may be considered if the polyp burden is dense/not manageable by polypectomy or based on patient preference.   For a personal history of  adenomas with an adenoma burden that cannot be managed endoscopically, patients should receive surgical evaluation and counseling is appropriate.   Baseline upper endoscopy (including complete visualization of the ampulla of Vater) at the time of next colonoscopy surveillance by age 20-25 and repeat following duodenal surveillance guidelines.     Austen should continue with the recommendations of his physician care team. It should be noted that full records of his prior colonoscopy in 2010 was requested but unavailable for review, therefore his total polyp count is unclear at this time. We have documented records of at least 17 tubular adenomas, and we estimate his number to be over 20 given that Austen reported having about 4 adenomas on his first colonoscopy in 2010. Austen has upcoming surgery (laparoscopic Right Colectomy with Ileocolic Anastomosis ) on 7/23/25.  He has not yet had an upper endoscopy, but plans to talk to his doctor about this option.    Other Cancer Risks  In the absence of an identifiable genetic risk  factor, Austen Eagle's cancer risk are shaped by his personal and family history of cancer. Austen did not identify any family members with cancer. We discussed the importance of updated and comprehensive family history information. Any cancer surveillance planning for Austen should involve a discussion with their physicians and an assessment of their family history and personal risk factors.     Implications for Family Members  Austen Eagle was not found to have a pathogenic (disease-causing) genetic alteration in any of the tested genes that he could have passed down to his children. Genetic testing for his children is not indicated based on this genetic result, however his children may need testing if their mother's family has a strong history of cancer. His children's wife, Ludy, was present at the visit today and reports a history of papillary thyroid cancer at 52, but reports no other history of cancer in her family. We discussed that this alone is not very suspicious for a hereditary predisposition. However, all of her children are considered to be at an increased risk for thyroid cancer and should update their doctor about their family history.  However, although their results were negative, Mr. Eagle's close relatives, including his sons and half brother, are considered to have an increased risk to develop colon polyps/cancer over the general population, based on the family history alone. Austen's family members should speak to their healthcare providers about their colon cancer screening protocols, as increased screening may be recommended.   Per NCCN - First degree relatives of those with CPUE (20-<100 adenomas) with no PV identified should  undergo colonoscopy beginning in their late teens, then repeat every 2 years. Initial age and frequency of colonoscopy may be modified if cumulative family history of 20-<100 adenomas was reached later in life in the affected relative, which was the case for  "Austen. If they have multiple surveillance exams without adenomas, the interval between colonoscopies may be increased based on clinical judgement. If adenomas are found, then they should follow surveillance guidelines for AFAP.   Alternatively, first degree relatives of those with 10-19 adenomas with no PV identified should be managed based on clinical judgement. Frequency of surveillance may be modified based on personal, cumulative history of adenomas, taking into account current polyp surveillance guidelines and the family history.   Mr. Eagle reports that his eldest son had a negative colonoscopy, but his youngest son has not yet had a colonoscopy. We discussed that it may be reasonable for his youngest son to have a colonoscopy, and should talk to his doctor about the family history to determine appropriate surveillance. They plan to talk to their son about this.  Austen also reported a maternal cousin who underwent a colectomy in his 40's due to a substantial polyp burden. We discussed that or any of his close family members may be good candidates for genetic testing. Mr. Eagle explained that he is not in contact with his cousin.  If he is local, we would be glad to see them here at .  They can call [phone], option 1, to schedule an appointment.  For those who live outside the [place] area, they may find a genetics specialist in their area by visiting the National Society for Genetic Counselors website at: <http://www.nsgc.org/> under \"Find a Genetic Counselor,\" with \"Cancer\" specified under specialty area.      Our understanding of genetic contribution to cancer diagnoses is always evolving, so there may be additional testing recommended in the future. Mr. Eagle was asked to contact us in ~5 years to determine if there have been any changes since our discussion today. Additionally, we encourage Austen Eagle to keep us updated if there are any changes to her personal or family history of cancer. "     Rosa Ko MS, GC  Licensed Genetic Counselor  [facility]  Phone: [phone]     Total time spent on day of encounter: 58 minutes (15 minutes with patient, 43 minutes on pre/post patient care activities, including documentation).

## 2025-05-28 ENCOUNTER — CLINICAL SUPPORT (OUTPATIENT)
Dept: GENETICS | Facility: CLINIC | Age: 65
End: 2025-05-28
Payer: MEDICARE

## 2025-05-28 VITALS
BODY MASS INDEX: 29.52 KG/M2 | WEIGHT: 194.8 LBS | DIASTOLIC BLOOD PRESSURE: 72 MMHG | HEIGHT: 68 IN | HEART RATE: 81 BPM | SYSTOLIC BLOOD PRESSURE: 135 MMHG | TEMPERATURE: 98.1 F | RESPIRATION RATE: 17 BRPM

## 2025-05-28 DIAGNOSIS — Z71.83 ENCOUNTER FOR NONPROCREATIVE GENETIC COUNSELING: Primary | ICD-10-CM

## 2025-05-28 DIAGNOSIS — Z83.719 FAMILY HISTORY OF COLONIC POLYPS: ICD-10-CM

## 2025-05-28 DIAGNOSIS — D12.6 ADENOMATOUS POLYP OF COLON, UNSPECIFIED PART OF COLON: ICD-10-CM

## 2025-05-28 PROCEDURE — GENMD PR GENETICS VISIT (MEDICAID/MEDICARE)

## 2025-05-28 ASSESSMENT — PAIN SCALES - GENERAL: PAINLEVEL_OUTOF10: 7

## 2025-06-02 NOTE — PROGRESS NOTES
"Austen Eagle \"Chelle" is a 64 year old male History of diverticulitis with abscess and multiple right sided colonic polyps, endoscopically unresectable and concern for underlying invasion on endoscopy.  Patient returns to the office for a pre-op visit.  He is scheduled for 7/23/2025 Laparoscopic Right Colectomy.        HISTORY:   S/P IR drainage of abscess in 2010.  Colonoscopy in 2010 showed diverticulosis and tubular adenomas polyps.  He was supposed to follow up in 2013 but was lost to follow up.    November 2024 (+)Cologaurd.     Has had recurrent or chronic abdominal pain or issues since 2010.  Voids nuts and seeds.    1/15/2025 Colonoscopy to TI  The perianal and digital rectal exams were normal.  A few small mouthed diverticula were found in the sigmoid colon.  Internal non-bleeding hemorrhoids were found during retroflexion. The hemorrhoids were small.  The terminal ileum appeared normal.  A 15 mm polyp was found in the cecum. The polyp was sessile.  This was biopsied with cold forceps.  Two sessile polyps were found in the proximal ascending colon. The polyps were 4 to 7 mm in size. These polyps were removed with a cold snare.  Resection and retrieval were complete.  A 40 mm polyp was found in the mid ascending colon. The polyp was sessile and multi-lobulated. This was biopsied with a cold forceps.  A 12 mm polyp was found in the mid ascending colon . The polyp was sessile. The polyp was removed with a hot snare.  Resection and retrieval were complete.  Two sessile polyps were found in the distal ascending colon. The polyps were 3 mm in size. These polyps were removed with a cold biopsy forceps.  Resection and retrieval were complete.  Two sessile polyps were found in the hepatic flexure.  The polyps were 4 to 10 mm in size.  These polyps were removed with a hot snare.  Resection and retrieval were complete.  Four sessile polyps were found in the transverse colon, descending colon and mid rectum.  The polyps " were 6 mm in size.  These polyps were removed with a cold snare.  Resection and retrieval were complete.  The exam was otherwise normal throughout the examined colon.  Pathology  1. Cecal colon lesion, biopsy:  Fragments of tubular adenoma. No high grade dysplasia or malignancy  2. Proximal ascending colon polyp x 2: Tubular adenomas. No high grade dysplasia or malignancy  3.  Proximal Ascending colon, biopsy:  Fragments of tubular adenoma.  No high grade dysplasia or malignancy  4.  Mid Ascending colon polyp:  Tubular adenoma. No high grade dysplasia or malignancy  5.  Distal Ascending colon polyp biopsy x 2:  Tubular adenoma.   No high grade dysplasia or malignancy  6.  Hepatic Flexure Colon polyp x 2:  Fragments of tubulovillous adenomas  No high grade dysplasia or malignancy  7.  Transverse colon polyp:  Fragments of tubular adenoma.  No high grade dysplasia or malignancy.       3/06/2025 Colonoscopy to TI:  Findings  Sessile polyp measuring 5-9 mm in the rectum; performed cold forceps biopsy with complete en bloc removal  Polyp measuring 5-9 mm in the cecum; performed cold snare with complete removal  Depressed polyp measuring 20 mm or greater in the cecum; lift performed by injecting a solution into the submucosa; performed cold forceps biopsy  80 mm benign-appearing LST-G polyp in the ascending colon  Ten or more polyps measuring smaller than 5 mm in the hepatic flexure and transverse colon; performed cold snare removal  Small and large diverticula of moderate severity causing moderate luminal narrowing in the sigmoid colon  Pathology:   A.  Rectum polyp:  --Hyperplastic polyp     B.  Colon, cecum, polyp:  --Tubular adenoma, multiple fragments     C.  Colon, cecum, polyp:  --Tubular adenoma, multiple fragments     D.  Transverse colon polyp x 4:  --Tubular adenoma, multiple fragments     March 26, 2025 Genetics appointment.  Negative     4/15/2025 CT Chest/Abd/Pelvis with contrast  CHEST:  1.  Mild dilatation  of the main pulmonary artery which may indicate  pulmonary artery hypertension.  2. 7 mm in short axis diameter left paratracheal lymph node at the  level of the thoracic inlet is seen but is within normal-sized limits.  3. Old healed compression fracture of T4.  4. No evidence for metastatic disease to the chest.      ABDOMEN-PELVIS:  1.  2 very small hypodense liver lesions too small to accurately characterize.  2. 3 cm right renal cyst.  3. New 1.4 x 1.6 cm left adrenal nodule not seen in 2010. This could be further evaluated utilizing MRI of the adrenal glands.  4. There is a roughly 2 cm sessile soft tissue lesion suggested along the medial aspect of the proximal ascending colon which may represent remnant of a sessile polyp.  5. Small fat containing umbilical hernia.  6. Diverticulosis of the colon.     Past Medical History  HTN  Arthritis  Diverticulitis  Colon polyps     Surgical History  IR drainage of abscess     Review of Systems  Constitutional: Negative for fever, chills, anorexia, weight loss, malaise             ENMT: Negative for nasal discharge, congestion, ear pain, mouth pain, throat pain                 Respiratory: Negative for cough, hemoptysis, wheezing, shortness of breath                Cardiac: Negative for chest pain, dyspnea on exertion, orthopnea, palpitations, syncope, (+)HTN                Gastrointestinal: Negative for nausea, vomiting, diarrhea, constipation, abdominal pain, (+)DIVERTICULITIS, (+)COLON POLYPS     Genitourinary: Negative for discharge, dysuria, flank pain, frequency, hematuria          Musculoskeletal: Negative for decreased ROM, pain, swelling, weakness, (+)ARTHRITIS           Neurological: Negative for dizziness, confusion, headache, seizures, syncope               Psychiatric: Negative for mood changes, anxiety, hallucinations, sleep changes, suicidal ideas        Skin: Negative for mass, pain, itching, rash, ulcer            Endocrine: Negative for heat  intolerance, cold intolerance, excessive sweating, polyuria, excess thirst         Hematologic/Lymph: Negative for anemia, bruising, easy bleeding, night sweats, petechiae, history of DVT/PE or cancer               Allergic/Immunologic: Negative for anaphylaxis, itchy/ teary eyes, itching, sneezing, swelling     Physical Exam  Constitutional: Well developed, awake/alert/oriented x3, no distress, alert and cooperative             Eyes: Sclera anicteric, no conjunctival inflammation, conjugate gaze    ENMT: mucous membranes moist, no apparent injury,            Head/Neck: Neck supple, no apparent injury, No JVD, trachea midline, no bruits              Respiratory/Thorax: Patent airways, CTAB, normal breath sounds with good chest expansion, thorax symmetric         Cardiovascular: Regular, rate and rhythm, no murmurs, normal S1 and S2         Gastrointestinal: Small spontaneously reducible umbilical hernia nondistended, soft, non-tender, no rebound tenderness or guarding, no masses palpable, no organomegaly, +BS, no bruits               Extremities: normal extremities, no cyanosis edema, contusions or wounds, 2+ femoral pulses B/L              Neurological: alert and oriented x3, normal strength, Normal gait          Lymphatic: No palpable inguinal lymphadenopathy   Psychological: Appropriate mood and behavior         Skin: Warm and dry, no lesions, no rashes                Anorectal:        Impression:  Multiple right sided colonic polyps, endoscopically unresectable and concern for underlying invasion on endoscopy     Plan:    Laparoscopic Right Colectomy

## 2025-06-10 ENCOUNTER — APPOINTMENT (OUTPATIENT)
Dept: SURGERY | Facility: CLINIC | Age: 65
End: 2025-06-10
Payer: MEDICARE

## 2025-06-13 ENCOUNTER — APPOINTMENT (OUTPATIENT)
Dept: PRIMARY CARE | Facility: CLINIC | Age: 65
End: 2025-06-13
Payer: MEDICARE

## 2025-07-09 ENCOUNTER — APPOINTMENT (OUTPATIENT)
Dept: PREADMISSION TESTING | Facility: HOSPITAL | Age: 65
End: 2025-07-09
Payer: MEDICARE

## 2025-07-11 ENCOUNTER — CLINICAL SUPPORT (OUTPATIENT)
Dept: PREADMISSION TESTING | Facility: HOSPITAL | Age: 65
End: 2025-07-11
Payer: MEDICARE

## 2025-07-11 RX ORDER — FEXOFENADINE HCL 180 MG/1
180 TABLET ORAL DAILY
COMMUNITY

## 2025-07-11 NOTE — CPM/PAT NURSE NOTE
"CPM/PAT Nurse Note      Name: Austen Eagle (Austen Eagle \"Bernardo\")  /Age: 1960/64 y.o.       Medical History[1]    Surgical History[2]    Patient Sexual activity questions deferred to the physician.    Family History[3]    Allergies[4]    Prior to Admission medications    Medication Sig Start Date End Date Taking? Authorizing Provider   amLODIPine (Norvasc) 10 mg tablet TAKE 1 TABLET BY MOUTH EVERY DAY 25   Dorian Mckeon MD   benazepril (Lotensin) 40 mg tablet TAKE 1 TABLET BY MOUTH EVERY DAY 25   Dorian Mckeon MD   biotin 1 mg capsule Take 1 capsule (1 mg) by mouth once daily.    Historical Provider, MD   chlorhexidine (Peridex) 0.12 % solution Rinse mouth with 15 ml after toothbrushing the night before surgery and on the morning of surgery.  Expectorate after rinsing.  Do not swallow. 25   Harrison Dash MD   cholecalciferol (Vitamin D-3) 125 mcg (5000 UT) capsule Take 1 capsule (125 mcg) by mouth once daily. 24   Dorian Mckeon MD   fexofenadine (Allegra) 180 mg tablet Take 1 tablet (180 mg) by mouth once daily.    Historical Provider, MD   gabapentin (Neurontin) 100 mg capsule Take one capsule by mouth starting three days before surgery at bedtime. 25   Harrison Dash MD   glucosamine/chondr sloan A sod (OSTEO BI-FLEX ORAL) Take by mouth.    Historical Provider, MD   metroNIDAZOLE (Flagyl) 250 mg tablet Take one tablet at 6p, 7p, and 11p the night before surgery. 25   Harrison Dash MD   multivitamin tablet Take 1 tablet by mouth once daily. 3/26/24   Dorian Mckeon MD   neomycin (Mycifradin) 500 mg tablet Take two tabs by mouth at 6p, 7pm, and 11p the night before surgery. 25   Harrison Dash MD   psyllium (Metamucil) 3.4 gram packet once every 24 hours.    Historical Provider, MD   vit A/vit C/vit E/zinc/copper (ICAPS AREDS ORAL) Take 1 tablet by mouth 2 times a day.    Historical Provider, MD CHRIS SRINIVASAN     DASI Risk Score      Flowsheet Row " Questionnaire Series Submission from 4/2/2025 in [facility] with Generic Provider Shanae   Can you take care of yourself (eat, dress, bathe, or use toilet)?  2.75  filed at 04/02/2025 0851   Can you walk indoors, such as around your house? 1.75  filed at 04/02/2025 0851   Can you walk a block or two on level ground?  2.75  filed at 04/02/2025 0851   Can you climb a flight of stairs or walk up a hill? 5.5  filed at 04/02/2025 0851   Can you run a short distance? 0  filed at 04/02/2025 0851   Can you do light work around the house like dusting or washing dishes? 2.7  filed at 04/02/2025 0851   Can you do moderate work around the house like vacuuming, sweeping floors or carrying groceries? 3.5  filed at 04/02/2025 0851   Can you do heavy work around the house like scrubbing floors or lifting and moving heavy furniture?  8  filed at 04/02/2025 0851   Can you do yard work like raking leaves, weeding or pushing a mower? 4.5  filed at 04/02/2025 0851   Can you have sexual relations? 5.25  filed at 04/02/2025 0851   Can you participate in moderate recreational activities like golf, bowling, dancing, doubles tennis or throwing a baseball or football? 6  filed at 04/02/2025 0851   Can you participate in strenous sports like swimming, singles tennis, football, basketball, or skiing? 7.5  filed at 04/02/2025 0851   DASI SCORE 50.2  filed at 04/02/2025 0851   METS Score (Will be calculated only when all the questions are answered) 8.9  filed at 04/02/2025 0851          Caprini DVT Assessment    No data to display       Modified Frailty Index    No data to display       [id number] Stroke Risk Points  Current as of just now        N/A 0 to 9 Points:      Last Change: N/A          The DJX8CU5-PRXt risk score (Lip GH, et al. 2009. © 2010 [employer]) quantifies the risk of stroke for a patient with atrial fibrillation. For patients without atrial fibrillation or under the age of 18 this score appears as  N/A. Higher score values generally indicate higher risk of stroke.        This score is not applicable to this patient. Components are not calculated.          Revised Cardiac Risk Index    No data to display       Apfel Simplified Score    No data to display       Risk Analysis Index Results This Encounter    No data found in the last 10 encounters.       Stop Bang Score      Flowsheet Row Questionnaire Series Submission from 4/2/2025 in [facility] with Generic Provider Shanae   Do you snore loudly? 0  filed at 04/02/2025 0851   Do you often feel tired or fatigued after your sleep? 0  filed at 04/02/2025 0851   Has anyone ever observed you stop breathing in your sleep? 0  filed at 04/02/2025 0851   Do you have or are you being treated for high blood pressure? 1  filed at 04/02/2025 0851   Recent BMI (Calculated) 26.5  filed at 04/02/2025 0851   Is BMI greater than 35 kg/m2? 0=No  filed at 04/02/2025 0851   Age older than 50 years old? 1=Yes  filed at 04/02/2025 0851   Gender - Male 1=Yes  filed at 04/02/2025 0851          Prodigy: High Risk  Total Score: 16              Prodigy Age Score      Prodigy Gender Score          ARISCAT Score for Postoperative Pulmonary Complications    No data to display       [facility]ox Perioperative Risk for Myocardial Infarction or Cardiac Arrest (MAME)    No data to display         Nurse Plan of Action:     RN screening call complete.  Reviewed allergies, medications and pharmacy, medical, surgical and social history with patient.  Chart updated.             [1]   Past Medical History:  Diagnosis Date    Adenomatous polyp of ascending colon     Colon polyp 2010    Diverticulitis of colon 2010    Diverticulosis 6/2010    Gout     History of heavy alcohol consumption     6 pack daily    Hyperlipidemia     Hypertension 2010    Lumbar radiculopathy     PAC (premature atrial contraction)     Positive colorectal cancer screening using Cologuard test     11/2024   [2]   Past Surgical  History:  Procedure Laterality Date    ABSCESS DRAINAGE  2010    IR drainage of diverticular abscess    COLONOSCOPY     [3]   Family History  Problem Relation Name Age of Onset    Diabetes Mother Robyn Mathews     Accidental death Father          drowning    No Known Problems Half-Brother     [4]   Allergies  Allergen Reactions    Aspirin Hives    Hay Fever And Allergy Relief Runny nose    Ibuprofen Hives    Pollen Extracts Runny nose

## 2025-07-14 DIAGNOSIS — K63.5 COLON POLYPOSIS: Primary | ICD-10-CM

## 2025-07-14 RX ORDER — CEFAZOLIN SODIUM 2 G/100ML
2 INJECTION, SOLUTION INTRAVENOUS ONCE
Status: CANCELLED | OUTPATIENT
Start: 2025-07-14 | End: 2025-07-14

## 2025-07-14 RX ORDER — HEPARIN SODIUM 5000 [USP'U]/ML
5000 INJECTION, SOLUTION INTRAVENOUS; SUBCUTANEOUS ONCE
Status: CANCELLED | OUTPATIENT
Start: 2025-07-14 | End: 2025-07-14

## 2025-07-14 RX ORDER — METRONIDAZOLE 500 MG/100ML
500 INJECTION, SOLUTION INTRAVENOUS ONCE
Status: CANCELLED | OUTPATIENT
Start: 2025-07-14 | End: 2025-07-14

## 2025-07-15 ENCOUNTER — DOCUMENTATION (OUTPATIENT)
Dept: PREADMISSION TESTING | Facility: HOSPITAL | Age: 65
End: 2025-07-15

## 2025-07-15 ENCOUNTER — LAB (OUTPATIENT)
Dept: LAB | Facility: HOSPITAL | Age: 65
End: 2025-07-15
Payer: MEDICARE

## 2025-07-15 ENCOUNTER — PRE-ADMISSION TESTING (OUTPATIENT)
Dept: PREADMISSION TESTING | Facility: HOSPITAL | Age: 65
End: 2025-07-15
Payer: MEDICARE

## 2025-07-15 VITALS
WEIGHT: 182.98 LBS | RESPIRATION RATE: 18 BRPM | HEART RATE: 81 BPM | HEIGHT: 68 IN | BODY MASS INDEX: 27.73 KG/M2 | OXYGEN SATURATION: 97 % | DIASTOLIC BLOOD PRESSURE: 81 MMHG | TEMPERATURE: 97.8 F | SYSTOLIC BLOOD PRESSURE: 145 MMHG

## 2025-07-15 DIAGNOSIS — Z01.818 PREOP TESTING: Primary | ICD-10-CM

## 2025-07-15 LAB
ABO GROUP (TYPE) IN BLOOD: NORMAL
ANION GAP SERPL CALC-SCNC: 15 MMOL/L (ref 10–20)
ANTIBODY SCREEN: NORMAL
BASOPHILS # BLD AUTO: 0.04 X10*3/UL (ref 0–0.1)
BASOPHILS NFR BLD AUTO: 0.5 %
BUN SERPL-MCNC: 9 MG/DL (ref 6–23)
CALCIUM SERPL-MCNC: 9.2 MG/DL (ref 8.6–10.3)
CHLORIDE SERPL-SCNC: 107 MMOL/L (ref 98–107)
CO2 SERPL-SCNC: 21 MMOL/L (ref 21–32)
CREAT SERPL-MCNC: 0.72 MG/DL (ref 0.5–1.3)
EGFRCR SERPLBLD CKD-EPI 2021: >90 ML/MIN/1.73M*2
EOSINOPHIL # BLD AUTO: 0.18 X10*3/UL (ref 0–0.7)
EOSINOPHIL NFR BLD AUTO: 2.4 %
ERYTHROCYTE [DISTWIDTH] IN BLOOD BY AUTOMATED COUNT: 13.1 % (ref 11.5–14.5)
GLUCOSE SERPL-MCNC: 104 MG/DL (ref 74–99)
HCT VFR BLD AUTO: 43.5 % (ref 41–52)
HGB BLD-MCNC: 14.9 G/DL (ref 13.5–17.5)
IMM GRANULOCYTES # BLD AUTO: 0.03 X10*3/UL (ref 0–0.7)
IMM GRANULOCYTES NFR BLD AUTO: 0.4 % (ref 0–0.9)
LYMPHOCYTES # BLD AUTO: 1.97 X10*3/UL (ref 1.2–4.8)
LYMPHOCYTES NFR BLD AUTO: 26 %
MCH RBC QN AUTO: 32.2 PG (ref 26–34)
MCHC RBC AUTO-ENTMCNC: 34.3 G/DL (ref 32–36)
MCV RBC AUTO: 94 FL (ref 80–100)
MONOCYTES # BLD AUTO: 0.59 X10*3/UL (ref 0.1–1)
MONOCYTES NFR BLD AUTO: 7.8 %
NEUTROPHILS # BLD AUTO: 4.76 X10*3/UL (ref 1.2–7.7)
NEUTROPHILS NFR BLD AUTO: 62.9 %
NRBC BLD-RTO: 0 /100 WBCS (ref 0–0)
PLATELET # BLD AUTO: 243 X10*3/UL (ref 150–450)
POTASSIUM SERPL-SCNC: 4.1 MMOL/L (ref 3.5–5.3)
RBC # BLD AUTO: 4.63 X10*6/UL (ref 4.5–5.9)
RH FACTOR (ANTIGEN D): NORMAL
SODIUM SERPL-SCNC: 139 MMOL/L (ref 136–145)
WBC # BLD AUTO: 7.6 X10*3/UL (ref 4.4–11.3)

## 2025-07-15 PROCEDURE — 99204 OFFICE O/P NEW MOD 45 MIN: CPT | Performed by: PHYSICIAN ASSISTANT

## 2025-07-15 PROCEDURE — 85025 COMPLETE CBC W/AUTO DIFF WBC: CPT

## 2025-07-15 PROCEDURE — 86901 BLOOD TYPING SEROLOGIC RH(D): CPT

## 2025-07-15 PROCEDURE — 86900 BLOOD TYPING SEROLOGIC ABO: CPT

## 2025-07-15 PROCEDURE — 80048 BASIC METABOLIC PNL TOTAL CA: CPT

## 2025-07-15 PROCEDURE — 87081 CULTURE SCREEN ONLY: CPT | Mod: AHULAB | Performed by: PHYSICIAN ASSISTANT

## 2025-07-15 PROCEDURE — 86850 RBC ANTIBODY SCREEN: CPT

## 2025-07-15 ASSESSMENT — ENCOUNTER SYMPTOMS
CONFUSION: 0
RHINORRHEA: 0
SINUS CONGESTION: 0
EXCESSIVE BLEEDING: 0
WEAKNESS: 0
PALPITATIONS: 0
WOUND: 0
BRUISES/BLEEDS EASILY: 1
TROUBLE SWALLOWING: 0
DYSPNEA AT REST: 0
NUMBNESS: 0
LIGHT-HEADEDNESS: 0
ABDOMINAL PAIN: 0
FEVER: 0
SKIN CHANGES: 0
LIMITED RANGE OF MOTION: 0
ABDOMINAL DISTENTION: 0
WHEEZING: 0
NECK PAIN: 0
CONSTIPATION: 0
UNEXPECTED WEIGHT CHANGE: 0
DIFFICULTY URINATING: 0
BLOOD IN STOOL: 0
SHORTNESS OF BREATH: 0
DYSURIA: 0
VOMITING: 0
NECK STIFFNESS: 0
VISUAL CHANGE: 0
DOUBLE VISION: 0
DIARRHEA: 0
EYE DISCHARGE: 0
EYE PAIN: 0
DYSPNEA WITH EXERTION: 0
CHILLS: 0
COUGH: 0
HEMOPTYSIS: 0
ARTHRALGIAS: 1
NAUSEA: 0
MYALGIAS: 0

## 2025-07-15 NOTE — H&P (VIEW-ONLY)
"[facility]/PAT Evaluation       Name: Austen Eagle (Austen Eagle \"Bernardo\")  /Age: 1960/64 y.o.       Date of Consult: 7/15/25    Referring Provider: Dr. Dash    Surgery, Date, and Length:     Laparoscopic Right Colectomy with Ileocolic Anastomosis   25, 140MIN    Austen Eagle is a 64 y.o. year-old male who presents to the [facility] for perioperative risk assessment prior to surgery.    Patient presents with a primary diagnosis of adenomatous polyp of ascending colon. History of diverticulitis with abscess.  S/P IR drainage of abscess in .  Colonoscopy in  showed diverticulosis and tubular adenomas polyps.  He was supposed to follow up in  but was lost to follow up.    2024 (+)Cologaurd 24. Colonoscopy 3/6/25 reveals multiple polyps. No current abdominal pain, dysuria, f/c/n/v.  Pt wishes to proceed with surgery as planned.        This note was created in part upon personal review of patient's medical records.      Patient is scheduled to have Laparoscopic Right Colectomy with Ileocolic Anastomosis      Pt denies any past history of anesthetic complications such as PONV, awareness, prolonged sedation, dental damage, aspiration, cardiac arrest, difficult intubation, difficult I.V. access or unexpected hospital admissions.  NO malignant hyperthermia and or pseudocholinesterase deficiency.  No history of blood transfusions     The patient is not a [employer]oration[employer] and will accept blood and blood products if medically indicated.   Type and screen sent.     Past Medical History:   Diagnosis Date    Adenomatous polyp of ascending colon     Colon polyp     Diverticulitis of colon 2010    Diverticulosis 2010    Gout     History of heavy alcohol consumption     6 pack daily    Hyperlipidemia     Hypertension     Lumbar radiculopathy     PAC (premature atrial contraction)     Positive colorectal cancer screening using Cologuard test     2024       Past Surgical History: "   Procedure Laterality Date    ABSCESS DRAINAGE  2010    IR drainage of diverticular abscess    COLONOSCOPY         Patient Sexual activity questions deferred to the physician.    Family History   Problem Relation Name Age of Onset    Diabetes Mother Robyn Mathews     Accidental death Father          drowning    No Known Problems Half-Brother       Social History     Socioeconomic History    Marital status:      Spouse name: Not on file    Number of children: Not on file    Years of education: Not on file    Highest education level: Not on file   Occupational History    Not on file   Tobacco Use    Smoking status: Never    Smokeless tobacco: Never   Vaping Use    Vaping status: Never Used   Substance and Sexual Activity    Alcohol use: Yes     Alcohol/week: 42.0 standard drinks of alcohol     Types: 42 Cans of beer per week     Comment: 6 pack daily    Drug use: Yes     Frequency: 7.0 times per week     Types: Marijuana     Comment: pt denies history of other drug use    Sexual activity: Defer     Partners: Female     Birth control/protection: None   Other Topics Concern    Not on file   Social History Narrative    Not on file     Social Drivers of Health     Financial Resource Strain: Not on file   Food Insecurity: Not on file   Transportation Needs: Not on file   Physical Activity: Not on file   Stress: Not on file   Social Connections: Not on file   Intimate Partner Violence: Not on file   Housing Stability: Not on file        Allergies   Allergen Reactions    Aspirin Hives    Hay Fever And Allergy Relief Runny nose    Ibuprofen Hives    Pollen Extracts Runny nose       Current Outpatient Medications   Medication Instructions    amLODIPine (NORVASC) 10 mg, oral, Daily    benazepril (LOTENSIN) 40 mg, oral, Daily    biotin 1 mg capsule 1 capsule, Daily    chlorhexidine (Peridex) 0.12 % solution Rinse mouth with 15 ml after toothbrushing the night before surgery and on the morning of surgery.  Expectorate after  rinsing.  Do not swallow.    cholecalciferol (VITAMIN D-3) 125 mcg, oral, Daily    fexofenadine (ALLEGRA) 180 mg, Daily    gabapentin (Neurontin) 100 mg capsule Take one capsule by mouth starting three days before surgery at bedtime.    glucosamine/chondr sloan A sod (OSTEO BI-FLEX ORAL) Take by mouth.    metroNIDAZOLE (Flagyl) 250 mg tablet Take one tablet at 6p, 7p, and 11p the night before surgery.    multivitamin tablet 1 tablet, oral, Daily    neomycin (Mycifradin) 500 mg tablet Take two tabs by mouth at 6p, 7pm, and 11p the night before surgery.    psyllium (Metamucil) 3.4 gram packet Every 24 hours    vit A/vit C/vit E/zinc/copper (ICAPS AREDS ORAL) 1 tablet, 2 times daily           PAT ROS:   Constitutional:    no fever   no chills   no unexpected weight change  Neuro/Psych:    no numbness   no weakness   no light-headedness   no confusion  Eyes:    no discharge   no pain   no vision loss   no diplopia   no visual disturbance  Ears:    no ear pain   no hearing loss   no tinnitus  Nose:    no nasal discharge   no sinus congestion   no epistaxis  Mouth:    no dental issues   no mouth pain   no oral bleeding   no mouth lesions  Throat:    no throat pain   no dysphagia  Neck:    no neck pain   no neck stiffness  Cardio:    Functional 4 Mets. Patient denies SOB walking up 2 flights of stairs   Walking dog; camping; cooking, cleaning, grocery shopping   no chest pain   no palpitations   no peripheral edema   no dyspnea   no MILLER  Respiratory:    no cough   no wheezing   no hemoptysis   no shortness of breath  Endocrine:    no cold intolerance   no heat intolerance  GI:    no abdominal distention   no abdominal pain   no constipation   no diarrhea   no nausea   no vomiting   no blood in stool  :    no difficulty urinating   no dysuria   no oliguria  Musculoskeletal:    arthralgias (b/l hands)   no myalgias   no decreased ROM  Hematologic:    bruises/bleeds easily   no excessive bleeding   no history of blood  transfusion   no blood clots  Skin:   no skin changes   no sores/wound   no rash      Physical Exam  Constitutional:       General: He is not in acute distress.     Appearance: Normal appearance. He is not ill-appearing, toxic-appearing or diaphoretic.   HENT:      Head: Normocephalic and atraumatic.      Nose: Nose normal. No congestion or rhinorrhea.      Mouth/Throat:      Mouth: Mucous membranes are moist.      Pharynx: No posterior oropharyngeal erythema.   Eyes:      Extraocular Movements: Extraocular movements intact.      Conjunctiva/sclera: Conjunctivae normal.   Cardiovascular:      Rate and Rhythm: Normal rate and regular rhythm.      Pulses: Normal pulses.      Heart sounds: Normal heart sounds. No murmur heard.     No friction rub. No gallop.   Pulmonary:      Effort: Pulmonary effort is normal. No respiratory distress.      Breath sounds: Normal breath sounds. No stridor. No wheezing, rhonchi or rales.   Abdominal:      General: Bowel sounds are normal. There is no distension.      Palpations: Abdomen is soft. There is no mass.      Tenderness: There is no abdominal tenderness. There is no guarding or rebound.      Hernia: No hernia is present.   Musculoskeletal:         General: No swelling, tenderness, deformity or signs of injury. Normal range of motion.      Cervical back: Normal range of motion and neck supple. No rigidity or tenderness.   Skin:     General: Skin is warm and dry.      Coloration: Skin is not jaundiced or pale.      Findings: No bruising, erythema, lesion or rash.   Neurological:      General: No focal deficit present.      Mental Status: He is alert and oriented to person, place, and time.      Cranial Nerves: No cranial nerve deficit.      Sensory: No sensory deficit.      Motor: No weakness.      Coordination: Coordination normal.   Psychiatric:         Mood and Affect: Mood normal.         Behavior: Behavior normal.          PAT AIRWAY:   Airway:     Mallampati::  II    Neck  "ROM::  Full   No broken teeth, no dentures and no missing teeth           Visit Vitals  /81   Pulse 81   Temp 36.6 °C (97.8 °F) (Temporal)   Resp 18   Ht 1.721 m (5' 7.75\")   Wt 83 kg (182 lb 15.7 oz)   SpO2 97%   BMI 28.03 kg/m²   Smoking Status Never   BSA 1.99 m²           LABS:  Lab Results   Component Value Date    ALT 35 05/06/2025    AST 28 05/06/2025    ALKPHOS 63 05/06/2025    BILITOT 0.4 05/06/2025      Lab Results   Component Value Date    HGBA1C 5.5 05/06/2025      Lab Results   Component Value Date    WBC 7.6 07/15/2025    HGB 14.9 07/15/2025    HCT 43.5 07/15/2025    MCV 94 07/15/2025     07/15/2025      Lab Results   Component Value Date    GLUCOSE 104 (H) 07/15/2025    CALCIUM 9.2 07/15/2025     07/15/2025    K 4.1 07/15/2025    CO2 21 07/15/2025     07/15/2025    BUN 9 07/15/2025    CREATININE 0.72 07/15/2025        EKG 11/8/24        Assessment and Plan:   64 y.o.  male  scheduled for Laparoscopic Right Colectomy with Ileocolic Anastomosis on 7/23/25 with Dr. Dash for  adenomatous polyp.   P[facility] to [facility] for perioperative risk stratification and optimization      Cardiovascular:  Patient has no active cardiac symptoms.   Patient denies any chest pain, tightness, heaviness, pressure, radiating pain, palpitations, irregular heartbeats, lightheadedness, cough, congestion, shortness of breath, MILLER, PND, near syncope, weight loss or gain.    METS: 8.9  RCRI: 0 points, 3.9%  risk for postoperative MACE     HTN - benazepril HOLD evening prior to surgery and morning of surgery   ; cont amlodipine on dos   Encouraged lifestyle modifications, low-sodium diet, and increase activity as tolerated.  Monitor BP and follow up with managing physician for readings sustaining >140/90.    HLD - pt not currently on a lipid lowering med    Pulmonary:  No pulmonary diagnosis, however patient is at increased risk of perioperative complications secondary to  age > 60, major surgery, " duration of surgery > 2 hours  Stop Bang score is 3 placing patient at intermediate risk for YASMANY  ARISCAT: 26-44 points, 13.3% risk of in-hospital postoperative pulmonary complication  PRODIGY: High risk for opioid induced respiratory depression    **Pt provided with deep breathing exercises, incentive spirometer and instructions for use during PAT visit today**    Psych:  EtOH abuse - 42 beers per week  Monitor for signs and symptoms of substance withdrawal during the postoperative period, assess, and treat as needed with the appropriate monitoring tool.       Neuro:  No neurologic diagnosis, however, the patient is at increased risk for perioperative delirium secondary to  age, excessive alcohol use, polypharmacy, type and duration of surgery, Patient instructed on and provided cognitive exercises      Hematology:  Patient instructed to ambulate as soon as possible postoperatively to decrease thromboembolic risk.   Initiate mechanical DVT prophylaxis as soon as possible and initiate chemical prophylaxis when deemed safe from a bleeding standpoint post surgery.     LABS: CBC, BMP, T&S, MRSA ordered. Lab results reviewed and unremarkable.     Followup: MRSA pending    Caprini: 5    Risk assessment complete.  Patient is scheduled for a intermediate surgical risk procedure.        Preoperative medication instructions were provided and reviewed with the patient.  Any additional testing or evaluation was explained to the patient.  Nothing by mouth instructions were discussed and patient's questions were answered prior to conclusion to this encounter.  Patient verbalized understanding of preoperative instructions given in preadmission testing; discharge instructions available in EMR.    This note was dictated by a speech recognition.  Minor errors may have been detected in a speech recognition.

## 2025-07-15 NOTE — PREPROCEDURE INSTRUCTIONS
Medication List            Accurate as of July 15, 2025 10:48 AM. Always use your most recent med list.                amLODIPine 10 mg tablet  Commonly known as: Norvasc  TAKE 1 TABLET BY MOUTH EVERY DAY  Medication Adjustments for Surgery: Take on the morning of surgery     benazepril 40 mg tablet  Commonly known as: Lotensin  TAKE 1 TABLET BY MOUTH EVERY DAY  Medication Adjustments for Surgery: Take last dose 1 day (24 hours) before surgery  Notes to patient: HOLD evening prior to surgery and morning of surgery        biotin 1 mg capsule  Additional Medication Adjustments for Surgery: Take last dose 7 days before surgery  Notes to patient: Stop now     chlorhexidine 0.12 % solution  Commonly known as: Peridex  Rinse mouth with 15 ml after toothbrushing the night before surgery and on the morning of surgery.  Expectorate after rinsing.  Do not swallow.  Medication Adjustments for Surgery: Take/Use as prescribed     cholecalciferol 125 mcg (5,000 units) capsule  Commonly known as: Vitamin D-3  Take 1 capsule (125 mcg) by mouth once daily.  Medication Adjustments for Surgery: Do Not take on the morning of surgery     fexofenadine 180 mg tablet  Commonly known as: Allegra  Medication Adjustments for Surgery: Take/Use as prescribed     gabapentin 100 mg capsule  Commonly known as: Neurontin  Take one capsule by mouth starting three days before surgery at bedtime.  Medication Adjustments for Surgery: Take/Use as prescribed     ICAPS AREDS ORAL  Additional Medication Adjustments for Surgery: Take last dose 7 days before surgery  Notes to patient: Stop now     metroNIDAZOLE 250 mg tablet  Commonly known as: Flagyl  Take one tablet at 6p, 7p, and 11p the night before surgery.     multivitamin tablet  Take 1 tablet by mouth once daily.  Medication Adjustments for Surgery: Take/Use as prescribed     neomycin 500 mg tablet  Commonly known as: Mycifradin  Take two tabs by mouth at 6p, 7pm, and 11p the night before  surgery.  Medication Adjustments for Surgery: Take/Use as prescribed     OSTEO BI-FLEX ORAL  Additional Medication Adjustments for Surgery: Take last dose 7 days before surgery  Notes to patient: Stop now     psyllium 3.4 gram packet  Commonly known as: Metamucil  Medication Adjustments for Surgery: Do Not take on the morning of surgery                          **Concerning above medication instructions, if medication is normally taken at night, continue normal schedule.**  **DO NOT TAKE NIGHT PRIOR AND MORNING OF SURGERY**    CONTACT SURGEON'S OFFICE IF YOU DEVELOP:  * Fever = 100.4 F   * New respiratory symptoms (e.g. cough, shortness of breath, respiratory distress, sore throat)  * Recent loss of taste or smell  *Flu like symptoms such as headache, fatigue or gastrointestinal symptoms  * You develop any open sores, shingles, burning or painful urination   AND/OR:  * You no longer wish to have the surgery.  * Any other personal circumstances change that may lead to the need to cancel or defer this surgery.  *You were admitted to any hospital within one week of your planned procedure.    SMOKING:  *Quitting smoking can make a huge difference to your health and recovery from surgery.    *If you need help with quitting, call 7-785-QUIT-NOW.    THE DAY OF SURGERY:  *Do not eat any food after midnight the night before surgery.   *You must drink 13.5 ounces of clear liquids (i.e. water, black coffee (no milk or cream), tea, apple juice or electrolyte drinks (gatorade)) 2 hours before your arrival time.  *You may chew gum until 2 hours before your surgery    SURGICAL TIME  *You will be contacted between 2 p.m. and 6 p.m. the business day before your surgery with your arrival time.  *If you haven't received a call by 6pm, call [phone].  *Scheduled surgery times may change and you will be notified if this occurs-check your personal voicemail for any updates.    ON THE MORNING OF SURGERY:  *Wear comfortable, loose  fitting clothing.   *Do not use moisturizers, creams, lotions or perfume.  *All jewelry and valuables should be left at home.  *Prosthetic devices such as contact lenses, hearing aids, dentures, eyelash extensions, hairpins and body piercing must be removed before surgery.    BRING WITH YOU:  *Photo ID and insurance card  *Current list of medicines and allergies  *Pacemaker/Defibrillator/Heart stent cards  *CPAP machine and mask  *Slings/splints/crutches  *Copy of your complete Advanced Directive/DHPOA-if applicable  *Neurostimulator implant remote    PARKING AND ARRIVAL:  *Check in at the Main Entrance desk and let them know you are here for surgery.  *You will be directed to the 2nd floor surgical waiting area.    AFTER OUTPATIENT SURGERY:  *A responsible adult MUST accompany you at the time of discharge and stay with you for 24 hours after your surgery.  *You may NOT drive yourself home after surgery.  *You may use a taxi or ride sharing service ([employer], [employer]) to return home ONLY if you are accompanied by a friend or family member.  *Instructions for resuming your medications will be provided by your surgeon.      Home Preoperative Antibacterial Shower     What is a home preoperative antibacterial shower?  This shower is a way of cleaning the skin with a germ killing soap before surgery.  The soap contains chlorhexidine, commonly known as CHG.  CHG is a soap for your skin with germ killing ability.  Let your doctor know if you are allergic to chlorhexidine.    Why do I need to take a preoperative antibacterial shower?  Skin is not sterile.  It is best to try to make your skin as free of germs as possible before surgery.  Proper cleansing with a germ killing soap before surgery can lower the number of germs on your skin.  This helps to reduce the risk of infection at the surgical site.  Following the instructions listed below will help you prepare your skin for surgery.      How do I use the CHG skin  cleanser?  Steps:  Begin using your CHG soap five days before your scheduled surgery on ________________________.    Days 1-4 Shower before bed:  Wash your face and genitals with your normal soap and rinse.           2.    Apply the CHG soap to a clean wet washcloth.  Turn the water off or move away                From the water spray to avoid premature rinsing of the CHG soap as you are applying.     3.   Lather your entire body from the neck down.  Do not use on your face or genitals.  4. Pay special attention to the area(s) where your incision(s) will be located unless they are on your face.  Avoid scrubbing your skin too hard.  The important point is to have the CHG soap sit on your skin for 3 minutes.    When the 3 minutes are up, turn on the water and rinse the CHG soap off your body completely.   Pat yourself dry with a clean, freshly-laundered towel.  Dress in clean, freshly laundered night clothes.    Be sure to change bed sheets and blankets at least on the first night of CHG body wash use. May change linens every night of the above protocol for maximum benefit.   Day 5:  Last shower is the morning of surgery: Follow above Instructions.    NOTE:        *Keep CHG soap out of eyes and ear canals   *DO NOT wash with regular soap on your body after you have used the CHG soap solution  *DO NOT apply powders, lotions, or perfume.  *Deodorant may be used days 1-4, BUT NOT the day of surgery    Who should I contact if I have any questions regarding the use of CHG soap?  Call the [facility], Preadmission Testing at [phone] if you have any questions.              Patient Information: Pre-Operative Infection Prevention Measures     Why did I have my nose, under my arms and groin swabbed?  The purpose of the swab is to identify Staphylococcus aureus inside your nose or on your skin.  The swab was sent to the laboratory for culture.  A positive swab/culture for Staphylococcus aureus  is called colonization or carriage.      What is Staphylococcus aureus?  Staphylococcus aureus, also known as “staph”, is a germ found on the skin or in the nose of healthy people.  Sometimes Staphylococcus aureus can get into the body and cause an infection.  This can be minor (such as pimples, boils or other skin problems).  It might also be serious (such as blood infection, pneumonia or a surgical site infection).    What is Staphylococcus aureus colonization or carriage?  Colonization or carriage means that a person has the germ but is not sick from it.  These bacteria can be spread on the hands or when breathing or sneezing.    How is Staphylococcus aureus spread?  It is most often spread by close contact with a person or item that carries it.    What happens if my culture is positive for Staphylococcus aureus?  Your doctor/medical team will use this information to guide any antibiotic treatment which may be necessary.  Regardless of the culture results, we will clean the inside of your nose with a betadine swab just before you have your surgery.      Will I get an infection if I have Staphylococcus aureus in my nose or on my skin?  Anyone can get an infection with Staphylococcus aureus.  However, the best way to reduce your risk of infection is to follow the instructions provided to you for the use of your CHG soap and dental rinse.        Who should I contact if I have any questions?  Call the [facility], Preadmission Testing at [phone] if you have any questions.          Patient Information: Oral/Dental Rinse  **This is a prescription; pick it up at your preferred local pharmacy **  What is oral/dental rinse?   It is a mouthwash. It is a way of cleaning the mouth with a germ killing solution before your surgery.  The solution contains chlorhexidine, commonly known as CHG.   It is used inside the mouth to kill a bacteria known as Staphylococcus aureus.  Let your doctor know  if you are allergic to Chlorhexidine.    Why do I need to use CHG oral/dental rinse?  The CHG oral/dental rinse helps to kill a bacteria in your mouth known a Staphylococcus aureus.     This reduces the risk of infection at the surgical site.      Using your CHG oral/dental rinse  STEPS:  Use your CHG oral/dental rinse after you brush your teeth the night before (at bedtime) and the morning of your surgery.  Follow all directions on your prescription label.    Use the cap on the container to measure 15ml (fill cap to fill line)  Swish (gargle if you can) the mouthwash in your mouth for at least 30 seconds, (do not to swallow) spit out  After you use your CHG rinse, do not rinse your mouth with water, drink or eat.  Please refer to prescription label for the appropriate time to resume oral intake  Dental rinse comes in one size bottle: 473ml ~16oz.  You will have leftover    rinse, discard after this use.    What side effects might I have using the CHG oral/dental rinse?  CHG rinse will stick to plaque on the teeth.  Brush and floss just before use.  Teeth brushing will help avoid staining of plaque during use.    Who should I contact if I have questions about the CHG oral/dental rinse?  Please call [facility], Preadmission Testing at [phone] if you have any questions          Incentive Spirometer   You were provided with an incentive spirometer in [facility]/PAT, please follow the below instructions.    What is an incentive spirometer?  An incentive spirometer is a device used before and after surgery to “exercise” your lungs.  It helps you to take deeper breaths to expand your lungs.  Below is an example of a basic incentive spirometer.  The device you receive may differ slightly but they all function the same.    Why do I need to use an incentive spirometer?  Using your incentive spirometer prepares your lungs for surgery and helps prevent lung problems after surgery.  How do I use my  incentive spirometer?  When you're using your incentive spirometer, make sure to breathe through your mouth. If you breathe through your nose, the incentive spirometer won't work properly. You can hold your nose if you have trouble.  If you feel dizzy at any time, stop and rest. Try again at a later time.  Follow the steps below:  Set up your incentive spirometer, expand the flexible tubing and connect to the outlet.  Sit upright in a chair or bed. Hold the incentive spirometer at eye level.   Put the mouthpiece in your mouth and close your lips tightly around it. Slowly breathe out (exhale) completely.  Breathe in (inhale) slowly through your mouth as deeply as you can. As you take a breath, you will see the piston rise inside the large column. While the piston rises, the indicator should move upwards. It should stay in between the 2 arrows (see Figure).  Try to get the piston as high as you can, while keeping the indicator between the arrows.   If the indicator doesn't stay between the arrows, you're breathing either too fast or too slow.  When you get it as high as you can, hold your breath for 10 seconds, or as long as possible. While you're holding your breath, the piston will slowly fall to the base of the spirometer.  Once the piston reaches the bottom of the spirometer, breathe out slowly through your mouth. Rest for a few seconds.  Repeat 10 times. Try to get the piston to the same level with each breath.  Repeat every hour while awake  You can carefully clean the outside of the mouthpiece with an alcohol wipe or soap and water.        Preoperative Deep Breathing Exercises  Why it is important to do deep breathing exercises before my surgery?  Deep breathing exercises strengthen your breathing muscles.  This helps you to recover after your surgery and decreases the chance of breathing complications.  How are the deep breathing exercises done?  Sit straight with your back supported.  Breathe in deeply and  slowly through your nose. Your lower rib cage should expand and your abdomen may move forward.  Hold that breath for 3 to 5 seconds.  Breathe out through pursed lips, slowly and completely.  Rest and repeat 10 times every hour while awake.  Rest longer if you become dizzy or lightheaded.      Preoperative Brain Exercises    What are brain exercises?  A brain exercise is any activity that engages your thinking (cognitive) skills.    What types of activities are considered brain exercises?  Jigsaw puzzles, crossword puzzles, word jumble, memory games, word search, and many more.  Many can be found free online or on your phone via a mobile margot.    Why should I do brain exercises before my surgery?  More recent research has shown brain exercise before surgery can lower the risk of postoperative delirium (confusion) which can be especially important for older adults.  Patients who did brain exercises for 5 to 10 hours (total) for the 7-10 days before surgery, cut their risk of postoperative delirium in half up to 1 week after surgery.

## 2025-07-15 NOTE — CPM/PAT H&P
"[facility]/PAT Evaluation       Name: Austen Eagle (Austen Eagle \"Bernardo\")  /Age: 1960/64 y.o.       Date of Consult: 7/15/25    Referring Provider: Dr. Dash    Surgery, Date, and Length:     Laparoscopic Right Colectomy with Ileocolic Anastomosis   25, 140MIN    Austen Eagle is a 64 y.o. year-old male who presents to the [facility] for perioperative risk assessment prior to surgery.    Patient presents with a primary diagnosis of adenomatous polyp of ascending colon. History of diverticulitis with abscess.  S/P IR drainage of abscess in .  Colonoscopy in  showed diverticulosis and tubular adenomas polyps.  He was supposed to follow up in  but was lost to follow up.    2024 (+)Cologaurd 24. Colonoscopy 3/6/25 reveals multiple polyps. No current abdominal pain, dysuria, f/c/n/v.  Pt wishes to proceed with surgery as planned.        This note was created in part upon personal review of patient's medical records.      Patient is scheduled to have Laparoscopic Right Colectomy with Ileocolic Anastomosis      Pt denies any past history of anesthetic complications such as PONV, awareness, prolonged sedation, dental damage, aspiration, cardiac arrest, difficult intubation, difficult I.V. access or unexpected hospital admissions.  NO malignant hyperthermia and or pseudocholinesterase deficiency.  No history of blood transfusions     The patient is not a Jain and will accept blood and blood products if medically indicated.   Type and screen sent.     Past Medical History:   Diagnosis Date    Adenomatous polyp of ascending colon     Colon polyp     Diverticulitis of colon 2010    Diverticulosis 2010    Gout     History of heavy alcohol consumption     6 pack daily    Hyperlipidemia     Hypertension     Lumbar radiculopathy     PAC (premature atrial contraction)     Positive colorectal cancer screening using Cologuard test     2024       Past Surgical History: "   Procedure Laterality Date    ABSCESS DRAINAGE  2010    IR drainage of diverticular abscess    COLONOSCOPY         Patient Sexual activity questions deferred to the physician.    Family History   Problem Relation Name Age of Onset    Diabetes Mother Robyn Mathesw     Accidental death Father          drowning    No Known Problems Half-Brother       Social History     Socioeconomic History    Marital status:      Spouse name: Not on file    Number of children: Not on file    Years of education: Not on file    Highest education level: Not on file   Occupational History    Not on file   Tobacco Use    Smoking status: Never    Smokeless tobacco: Never   Vaping Use    Vaping status: Never Used   Substance and Sexual Activity    Alcohol use: Yes     Alcohol/week: 42.0 standard drinks of alcohol     Types: 42 Cans of beer per week     Comment: 6 pack daily    Drug use: Yes     Frequency: 7.0 times per week     Types: Marijuana     Comment: pt denies history of other drug use    Sexual activity: Defer     Partners: Female     Birth control/protection: None   Other Topics Concern    Not on file   Social History Narrative    Not on file     Social Drivers of Health     Financial Resource Strain: Not on file   Food Insecurity: Not on file   Transportation Needs: Not on file   Physical Activity: Not on file   Stress: Not on file   Social Connections: Not on file   Intimate Partner Violence: Not on file   Housing Stability: Not on file        Allergies   Allergen Reactions    Aspirin Hives    Hay Fever And Allergy Relief Runny nose    Ibuprofen Hives    Pollen Extracts Runny nose       Current Outpatient Medications   Medication Instructions    amLODIPine (NORVASC) 10 mg, oral, Daily    benazepril (LOTENSIN) 40 mg, oral, Daily    biotin 1 mg capsule 1 capsule, Daily    chlorhexidine (Peridex) 0.12 % solution Rinse mouth with 15 ml after toothbrushing the night before surgery and on the morning of surgery.  Expectorate after  rinsing.  Do not swallow.    cholecalciferol (VITAMIN D-3) 125 mcg, oral, Daily    fexofenadine (ALLEGRA) 180 mg, Daily    gabapentin (Neurontin) 100 mg capsule Take one capsule by mouth starting three days before surgery at bedtime.    glucosamine/chondr sloan A sod (OSTEO BI-FLEX ORAL) Take by mouth.    metroNIDAZOLE (Flagyl) 250 mg tablet Take one tablet at 6p, 7p, and 11p the night before surgery.    multivitamin tablet 1 tablet, oral, Daily    neomycin (Mycifradin) 500 mg tablet Take two tabs by mouth at 6p, 7pm, and 11p the night before surgery.    psyllium (Metamucil) 3.4 gram packet Every 24 hours    vit A/vit C/vit E/zinc/copper (ICAPS AREDS ORAL) 1 tablet, 2 times daily           PAT ROS:   Constitutional:    no fever   no chills   no unexpected weight change  Neuro/Psych:    no numbness   no weakness   no light-headedness   no confusion  Eyes:    no discharge   no pain   no vision loss   no diplopia   no visual disturbance  Ears:    no ear pain   no hearing loss   no tinnitus  Nose:    no nasal discharge   no sinus congestion   no epistaxis  Mouth:    no dental issues   no mouth pain   no oral bleeding   no mouth lesions  Throat:    no throat pain   no dysphagia  Neck:    no neck pain   no neck stiffness  Cardio:    Functional 4 Mets. Patient denies SOB walking up 2 flights of stairs   Walking dog; camping; cooking, cleaning, grocery shopping   no chest pain   no palpitations   no peripheral edema   no dyspnea   no MILLER  Respiratory:    no cough   no wheezing   no hemoptysis   no shortness of breath  Endocrine:    no cold intolerance   no heat intolerance  GI:    no abdominal distention   no abdominal pain   no constipation   no diarrhea   no nausea   no vomiting   no blood in stool  :    no difficulty urinating   no dysuria   no oliguria  Musculoskeletal:    arthralgias (b/l hands)   no myalgias   no decreased ROM  Hematologic:    bruises/bleeds easily   no excessive bleeding   no history of blood  transfusion   no blood clots  Skin:   no skin changes   no sores/wound   no rash      Physical Exam  Constitutional:       General: He is not in acute distress.     Appearance: Normal appearance. He is not ill-appearing, toxic-appearing or diaphoretic.   HENT:      Head: Normocephalic and atraumatic.      Nose: Nose normal. No congestion or rhinorrhea.      Mouth/Throat:      Mouth: Mucous membranes are moist.      Pharynx: No posterior oropharyngeal erythema.   Eyes:      Extraocular Movements: Extraocular movements intact.      Conjunctiva/sclera: Conjunctivae normal.   Cardiovascular:      Rate and Rhythm: Normal rate and regular rhythm.      Pulses: Normal pulses.      Heart sounds: Normal heart sounds. No murmur heard.     No friction rub. No gallop.   Pulmonary:      Effort: Pulmonary effort is normal. No respiratory distress.      Breath sounds: Normal breath sounds. No stridor. No wheezing, rhonchi or rales.   Abdominal:      General: Bowel sounds are normal. There is no distension.      Palpations: Abdomen is soft. There is no mass.      Tenderness: There is no abdominal tenderness. There is no guarding or rebound.      Hernia: No hernia is present.   Musculoskeletal:         General: No swelling, tenderness, deformity or signs of injury. Normal range of motion.      Cervical back: Normal range of motion and neck supple. No rigidity or tenderness.   Skin:     General: Skin is warm and dry.      Coloration: Skin is not jaundiced or pale.      Findings: No bruising, erythema, lesion or rash.   Neurological:      General: No focal deficit present.      Mental Status: He is alert and oriented to person, place, and time.      Cranial Nerves: No cranial nerve deficit.      Sensory: No sensory deficit.      Motor: No weakness.      Coordination: Coordination normal.   Psychiatric:         Mood and Affect: Mood normal.         Behavior: Behavior normal.          PAT AIRWAY:   Airway:     Mallampati::  II    Neck  "ROM::  Full   No broken teeth, no dentures and no missing teeth           Visit Vitals  /81   Pulse 81   Temp 36.6 °C (97.8 °F) (Temporal)   Resp 18   Ht 1.721 m (5' 7.75\")   Wt 83 kg (182 lb 15.7 oz)   SpO2 97%   BMI 28.03 kg/m²   Smoking Status Never   BSA 1.99 m²           LABS:  Lab Results   Component Value Date    ALT 35 05/06/2025    AST 28 05/06/2025    ALKPHOS 63 05/06/2025    BILITOT 0.4 05/06/2025      Lab Results   Component Value Date    HGBA1C 5.5 05/06/2025      Lab Results   Component Value Date    WBC 7.6 07/15/2025    HGB 14.9 07/15/2025    HCT 43.5 07/15/2025    MCV 94 07/15/2025     07/15/2025      Lab Results   Component Value Date    GLUCOSE 104 (H) 07/15/2025    CALCIUM 9.2 07/15/2025     07/15/2025    K 4.1 07/15/2025    CO2 21 07/15/2025     07/15/2025    BUN 9 07/15/2025    CREATININE 0.72 07/15/2025        EKG 11/8/24        Assessment and Plan:   64 y.o.  male  scheduled for Laparoscopic Right Colectomy with Ileocolic Anastomosis on 7/23/25 with Dr. Dash for  adenomatous polyp.   Presents to [facility] today for perioperative risk stratification and optimization      Cardiovascular:  Patient has no active cardiac symptoms.   Patient denies any chest pain, tightness, heaviness, pressure, radiating pain, palpitations, irregular heartbeats, lightheadedness, cough, congestion, shortness of breath, MILLER, PND, near syncope, weight loss or gain.    METS: 8.9  RCRI: 0 points, 3.9%  risk for postoperative MACE     HTN - benazepril HOLD evening prior to surgery and morning of surgery   ; cont amlodipine on dos   Encouraged lifestyle modifications, low-sodium diet, and increase activity as tolerated.  Monitor BP and follow up with managing physician for readings sustaining >140/90.    HLD - pt not currently on a lipid lowering med    Pulmonary:  No pulmonary diagnosis, however patient is at increased risk of perioperative complications secondary to  age > 60, major surgery, " duration of surgery > 2 hours  Stop Bang score is 3 placing patient at intermediate risk for YASMANY  ARISCAT: 26-44 points, 13.3% risk of in-hospital postoperative pulmonary complication  PRODIGY: High risk for opioid induced respiratory depression    **Pt provided with deep breathing exercises, incentive spirometer and instructions for use during PAT visit today**    Psych:  EtOH abuse - 42 beers per week  Monitor for signs and symptoms of substance withdrawal during the postoperative period, assess, and treat as needed with the appropriate monitoring tool.       Neuro:  No neurologic diagnosis, however, the patient is at increased risk for perioperative delirium secondary to  age, excessive alcohol use, polypharmacy, type and duration of surgery, Patient instructed on and provided cognitive exercises      Hematology:  Patient instructed to ambulate as soon as possible postoperatively to decrease thromboembolic risk.   Initiate mechanical DVT prophylaxis as soon as possible and initiate chemical prophylaxis when deemed safe from a bleeding standpoint post surgery.     LABS: CBC, BMP, T&S, MRSA ordered. Lab results reviewed and unremarkable.     Followup: MRSA pending    Caprini: 5    Risk assessment complete.  Patient is scheduled for a intermediate surgical risk procedure.        Preoperative medication instructions were provided and reviewed with the patient.  Any additional testing or evaluation was explained to the patient.  Nothing by mouth instructions were discussed and patient's questions were answered prior to conclusion to this encounter.  Patient verbalized understanding of preoperative instructions given in preadmission testing; discharge instructions available in EMR.    This note was dictated by a speech recognition.  Minor errors may have been detected in a speech recognition.

## 2025-07-15 NOTE — CPM/PAT NURSE NOTE
"CPM/PAT Nurse Note      Name: Austen Eagle (Austen Eagle \"Bernardo\")  /Age: 1960/64 y.o.       Medical History[1]    Surgical History[2]    Patient Sexual activity questions deferred to the physician.    Family History[3]    Allergies[4]    Prior to Admission medications    Medication Sig Start Date End Date Taking? Authorizing Provider   amLODIPine (Norvasc) 10 mg tablet TAKE 1 TABLET BY MOUTH EVERY DAY 25   Dorian Mckeon MD   benazepril (Lotensin) 40 mg tablet TAKE 1 TABLET BY MOUTH EVERY DAY 25   Dorian Mckeon MD   biotin 1 mg capsule Take 1 capsule (1 mg) by mouth once daily.    Historical Provider, MD   chlorhexidine (Peridex) 0.12 % solution Rinse mouth with 15 ml after toothbrushing the night before surgery and on the morning of surgery.  Expectorate after rinsing.  Do not swallow. 25   Harrison Dash MD   cholecalciferol (Vitamin D-3) 125 mcg (5000 UT) capsule Take 1 capsule (125 mcg) by mouth once daily. 24   Dorian Mckeon MD   fexofenadine (Allegra) 180 mg tablet Take 1 tablet (180 mg) by mouth once daily.    Historical Provider, MD   gabapentin (Neurontin) 100 mg capsule Take one capsule by mouth starting three days before surgery at bedtime.  Patient not taking: Reported on 7/15/2025 4/2/25   Harrison Dash MD   glucosamine/chondr sloan A sod (OSTEO BI-FLEX ORAL) Take by mouth.    Historical Provider, MD   metroNIDAZOLE (Flagyl) 250 mg tablet Take one tablet at 6p, 7p, and 11p the night before surgery.  Patient not taking: Reported on 7/15/2025 4/2/25   Harrison Dash MD   multivitamin tablet Take 1 tablet by mouth once daily. 3/26/24   Dorian Mckeon MD   neomycin (Mycifradin) 500 mg tablet Take two tabs by mouth at 6p, 7pm, and 11p the night before surgery.  Patient not taking: Reported on 7/15/2025 4/2/25   Harrison Dash MD   psyllium (Metamucil) 3.4 gram packet once every 24 hours.    Historical Provider, MD   vit A/vit C/vit E/zinc/copper (ICAPS AREDS " ORAL) Take 1 tablet by mouth 2 times a day.    Historical Provider, MD CHRIS SRINIVASAN     DASI Risk Score      Flowsheet Row Questionnaire Series Submission from 4/2/2025 in [facility] with Generic Provider Shanae   Can you take care of yourself (eat, dress, bathe, or use toilet)?  2.75  filed at 04/02/2025 0851   Can you walk indoors, such as around your house? 1.75  filed at 04/02/2025 0851   Can you walk a block or two on level ground?  2.75  filed at 04/02/2025 0851   Can you climb a flight of stairs or walk up a hill? 5.5  filed at 04/02/2025 0851   Can you run a short distance? 0  filed at 04/02/2025 0851   Can you do light work around the house like dusting or washing dishes? 2.7  filed at 04/02/2025 0851   Can you do moderate work around the house like vacuuming, sweeping floors or carrying groceries? 3.5  filed at 04/02/2025 0851   Can you do heavy work around the house like scrubbing floors or lifting and moving heavy furniture?  8  filed at 04/02/2025 0851   Can you do yard work like raking leaves, weeding or pushing a mower? 4.5  filed at 04/02/2025 0851   Can you have sexual relations? 5.25  filed at 04/02/2025 0851   Can you participate in moderate recreational activities like golf, bowling, dancing, doubles tennis or throwing a baseball or football? 6  filed at 04/02/2025 0851   Can you participate in strenous sports like swimming, singles tennis, football, basketball, or skiing? 7.5  filed at 04/02/2025 0851   DASI SCORE 50.2  filed at 04/02/2025 0851   METS Score (Will be calculated only when all the questions are answered) 8.9  filed at 04/02/2025 0851          Caprini DVT Assessment    No data to display       Modified Frailty Index    No data to display       [id number] Stroke Risk Points  Current as of just now        N/A 0 to 9 Points:      Last Change: N/A          The [id number] risk score (Melany MATHIS, et al. 2009. © 2010 American College [employer]) quantifies the risk of stroke  for a patient with atrial fibrillation. For patients without atrial fibrillation or under the age of 18 this score appears as N/A. Higher score values generally indicate higher risk of stroke.        This score is not applicable to this patient. Components are not calculated.          Revised Cardiac Risk Index    No data to display       Apfel Simplified Score    No data to display       Risk Analysis Index Results This Encounter    No data found in the last 10 encounters.       Stop Bang Score      Flowsheet Row Questionnaire Series Submission [facility] 4/2/2025 in [facility] with Generic Provider Shanae   Do you snore loudly? 0  filed at 04/02/2025 0851   Do you often feel tired or fatigued after your sleep? 0  filed at 04/02/2025 0851   Has anyone ever observed you stop breathing in your sleep? 0  filed at 04/02/2025 0851   Do you have or are you being treated for high blood pressure? 1  filed at 04/02/2025 0851   Recent BMI (Calculated) 26.5  filed at 04/02/2025 0851   Is BMI greater than 35 kg/m2? 0=No  filed at 04/02/2025 0851   Age older than 50 years old? 1=Yes  filed at 04/02/2025 0851   Gender - Male 1=Yes  filed at 04/02/2025 0851          Prodigy: High Risk  Total Score: 16              Prodigy Age Score      Prodigy Gender Score          ARISCAT Score for Postoperative Pulmonary Complications      Flowsheet Row Pre-Admission Testing from 7/15/2025 in [facility] with Ruth Frederick PA-C   Age Calculated Score 3 filed at 07/15/2025 1052   Preoperative SpO2 0 filed at 07/15/2025 1052   Respiratory infection in the last month Either upper or lower (i.e., URI, bronchitis, pneumonia), with fever and antibiotic treatment 0 filed at 07/15/2025 1052   Preoperative anemia (Hgb less than 10 g/dl) 0 filed at 07/15/2025 1052   Surgical incision  15 filed at 07/15/2025 1052   Duration of surgery  16 filed at 07/15/2025 1052   Emergency Procedure  0 filed at 07/15/2025 1052   ARISCAT Total Score  34  filed at 07/15/2025 1052          Kenny Perioperative Risk for Myocardial Infarction or Cardiac Arrest (MAME)    No data to display         Nurse Plan of Action:   After Visit Summary (AVS) reviewed and patient verbalized good understanding of medications and NPO instructions.    After Visit Summary (AVS) reviewed and patient verbalized good understanding of medications and NPO instructions.  Pre-op infection prevention measures:  CHG showers and mouthwash reviewed, understanding voiced.  CHG soap given and patient verbalized need to pick CHG mouthwash at their preferred local pharmacy.                 [1]   Past Medical History:  Diagnosis Date    Adenomatous polyp of ascending colon     Colon polyp 2010    Diverticulitis of colon 2010    Diverticulosis 6/2010    Gout     History of heavy alcohol consumption     6 pack daily    Hyperlipidemia     Hypertension 2010    Lumbar radiculopathy     PAC (premature atrial contraction)     Positive colorectal cancer screening using Cologuard test     11/2024   [2]   Past Surgical History:  Procedure Laterality Date    ABSCESS DRAINAGE  2010    IR drainage of diverticular abscess    COLONOSCOPY     [3]   Family History  Problem Relation Name Age of Onset    Diabetes Mother Robyn Mathews     Accidental death Father          drowning    No Known Problems Half-Brother     [4]   Allergies  Allergen Reactions    Aspirin Hives    Hay Fever And Allergy Relief Runny nose    Ibuprofen Hives    Pollen Extracts Runny nose

## 2025-07-17 LAB — STAPHYLOCOCCUS SPEC CULT: NORMAL

## 2025-07-23 ENCOUNTER — ANESTHESIA EVENT (OUTPATIENT)
Dept: OPERATING ROOM | Facility: HOSPITAL | Age: 65
End: 2025-07-23
Payer: MEDICARE

## 2025-07-23 ENCOUNTER — ANESTHESIA (OUTPATIENT)
Dept: OPERATING ROOM | Facility: HOSPITAL | Age: 65
End: 2025-07-23
Payer: MEDICARE

## 2025-07-23 ENCOUNTER — HOSPITAL ENCOUNTER (INPATIENT)
Facility: HOSPITAL | Age: 65
LOS: 3 days | Discharge: HOME | DRG: 331 | End: 2025-07-26
Attending: SURGERY | Admitting: SURGERY
Payer: MEDICARE

## 2025-07-23 DIAGNOSIS — D12.2 ADENOMATOUS POLYP OF ASCENDING COLON: ICD-10-CM

## 2025-07-23 DIAGNOSIS — K63.5 COLON POLYPOSIS: Primary | ICD-10-CM

## 2025-07-23 DIAGNOSIS — G89.18 ACUTE POST-OPERATIVE PAIN: ICD-10-CM

## 2025-07-23 LAB
ABO GROUP (TYPE) IN BLOOD: NORMAL
ANTIBODY SCREEN: NORMAL
CEA SERPL-MCNC: 0.7 UG/L
RH FACTOR (ANTIGEN D): NORMAL

## 2025-07-23 PROCEDURE — 2500000004 HC RX 250 GENERAL PHARMACY W/ HCPCS (ALT 636 FOR OP/ED): Performed by: ANESTHESIOLOGY

## 2025-07-23 PROCEDURE — 7100000024 HC EXTENDED STAY RECOVERY PER MINUTE- PACU: Performed by: SURGERY

## 2025-07-23 PROCEDURE — 82378 CARCINOEMBRYONIC ANTIGEN: CPT | Mod: AHULAB | Performed by: SURGERY

## 2025-07-23 PROCEDURE — 2500000005 HC RX 250 GENERAL PHARMACY W/O HCPCS: Performed by: ANESTHESIOLOGIST ASSISTANT

## 2025-07-23 PROCEDURE — 88309 TISSUE EXAM BY PATHOLOGIST: CPT | Mod: TC,AHULAB | Performed by: SURGERY

## 2025-07-23 PROCEDURE — 1100000001 HC PRIVATE ROOM DAILY

## 2025-07-23 PROCEDURE — 36415 COLL VENOUS BLD VENIPUNCTURE: CPT | Performed by: SURGERY

## 2025-07-23 PROCEDURE — 3600000004 HC OR TIME - INITIAL BASE CHARGE - PROCEDURE LEVEL FOUR: Performed by: SURGERY

## 2025-07-23 PROCEDURE — 0DTF4ZZ RESECTION OF RIGHT LARGE INTESTINE, PERCUTANEOUS ENDOSCOPIC APPROACH: ICD-10-PCS | Performed by: SURGERY

## 2025-07-23 PROCEDURE — 7100000002 HC RECOVERY ROOM TIME - EACH INCREMENTAL 1 MINUTE: Performed by: SURGERY

## 2025-07-23 PROCEDURE — 7100000001 HC RECOVERY ROOM TIME - INITIAL BASE CHARGE: Performed by: SURGERY

## 2025-07-23 PROCEDURE — 3600000009 HC OR TIME - EACH INCREMENTAL 1 MINUTE - PROCEDURE LEVEL FOUR: Performed by: SURGERY

## 2025-07-23 PROCEDURE — 2500000004 HC RX 250 GENERAL PHARMACY W/ HCPCS (ALT 636 FOR OP/ED): Performed by: SURGERY

## 2025-07-23 PROCEDURE — 86850 RBC ANTIBODY SCREEN: CPT | Performed by: SURGERY

## 2025-07-23 PROCEDURE — 99231 SBSQ HOSP IP/OBS SF/LOW 25: CPT

## 2025-07-23 PROCEDURE — 2500000005 HC RX 250 GENERAL PHARMACY W/O HCPCS: Performed by: SURGERY

## 2025-07-23 PROCEDURE — 9420000001 HC RT PATIENT EDUCATION 5 MIN

## 2025-07-23 PROCEDURE — 2500000004 HC RX 250 GENERAL PHARMACY W/ HCPCS (ALT 636 FOR OP/ED): Mod: JZ

## 2025-07-23 PROCEDURE — 2500000004 HC RX 250 GENERAL PHARMACY W/ HCPCS (ALT 636 FOR OP/ED): Performed by: STUDENT IN AN ORGANIZED HEALTH CARE EDUCATION/TRAINING PROGRAM

## 2025-07-23 PROCEDURE — 3700000001 HC GENERAL ANESTHESIA TIME - INITIAL BASE CHARGE: Performed by: SURGERY

## 2025-07-23 PROCEDURE — 2720000007 HC OR 272 NO HCPCS: Performed by: SURGERY

## 2025-07-23 PROCEDURE — 3700000002 HC GENERAL ANESTHESIA TIME - EACH INCREMENTAL 1 MINUTE: Performed by: SURGERY

## 2025-07-23 PROCEDURE — 2500000001 HC RX 250 WO HCPCS SELF ADMINISTERED DRUGS (ALT 637 FOR MEDICARE OP): Performed by: SURGERY

## 2025-07-23 PROCEDURE — 44204 LAPARO PARTIAL COLECTOMY: CPT | Performed by: SURGERY

## 2025-07-23 PROCEDURE — 2500000004 HC RX 250 GENERAL PHARMACY W/ HCPCS (ALT 636 FOR OP/ED): Performed by: ANESTHESIOLOGIST ASSISTANT

## 2025-07-23 RX ORDER — HYDROMORPHONE HYDROCHLORIDE 0.2 MG/ML
0.2 INJECTION INTRAMUSCULAR; INTRAVENOUS; SUBCUTANEOUS EVERY 2 HOUR PRN
Status: DISCONTINUED | OUTPATIENT
Start: 2025-07-23 | End: 2025-07-26 | Stop reason: HOSPADM

## 2025-07-23 RX ORDER — SODIUM CHLORIDE 9 MG/ML
40 INJECTION, SOLUTION INTRAVENOUS CONTINUOUS
Status: ACTIVE | OUTPATIENT
Start: 2025-07-23 | End: 2025-07-25

## 2025-07-23 RX ORDER — PROPOFOL 10 MG/ML
INJECTION, EMULSION INTRAVENOUS AS NEEDED
Status: DISCONTINUED | OUTPATIENT
Start: 2025-07-23 | End: 2025-07-23

## 2025-07-23 RX ORDER — ONDANSETRON 4 MG/1
4 TABLET, ORALLY DISINTEGRATING ORAL EVERY 8 HOURS PRN
Status: DISCONTINUED | OUTPATIENT
Start: 2025-07-23 | End: 2025-07-26 | Stop reason: HOSPADM

## 2025-07-23 RX ORDER — PROMETHAZINE HYDROCHLORIDE 25 MG/ML
12.5 INJECTION, SOLUTION INTRAMUSCULAR; INTRAVENOUS ONCE
Status: DISCONTINUED | OUTPATIENT
Start: 2025-07-23 | End: 2025-07-23 | Stop reason: HOSPADM

## 2025-07-23 RX ORDER — DROPERIDOL 2.5 MG/ML
0.62 INJECTION, SOLUTION INTRAMUSCULAR; INTRAVENOUS ONCE
Status: COMPLETED | OUTPATIENT
Start: 2025-07-23 | End: 2025-07-23

## 2025-07-23 RX ORDER — ROCURONIUM BROMIDE 10 MG/ML
INJECTION, SOLUTION INTRAVENOUS AS NEEDED
Status: DISCONTINUED | OUTPATIENT
Start: 2025-07-23 | End: 2025-07-23

## 2025-07-23 RX ORDER — ENOXAPARIN SODIUM 100 MG/ML
40 INJECTION SUBCUTANEOUS EVERY 24 HOURS
Status: DISCONTINUED | OUTPATIENT
Start: 2025-07-23 | End: 2025-07-26 | Stop reason: HOSPADM

## 2025-07-23 RX ORDER — LIDOCAINE HYDROCHLORIDE 10 MG/ML
0.1 INJECTION, SOLUTION EPIDURAL; INFILTRATION; INTRACAUDAL; PERINEURAL ONCE
Status: DISCONTINUED | OUTPATIENT
Start: 2025-07-23 | End: 2025-07-23 | Stop reason: HOSPADM

## 2025-07-23 RX ORDER — PROMETHAZINE HYDROCHLORIDE 25 MG/1
25 SUPPOSITORY RECTAL EVERY 12 HOURS PRN
Status: DISCONTINUED | OUTPATIENT
Start: 2025-07-23 | End: 2025-07-26 | Stop reason: HOSPADM

## 2025-07-23 RX ORDER — LIDOCAINE HCL/PF 100 MG/5ML
SYRINGE (ML) INTRAVENOUS AS NEEDED
Status: DISCONTINUED | OUTPATIENT
Start: 2025-07-23 | End: 2025-07-23

## 2025-07-23 RX ORDER — CEFAZOLIN 1 G/1
INJECTION, POWDER, FOR SOLUTION INTRAVENOUS AS NEEDED
Status: DISCONTINUED | OUTPATIENT
Start: 2025-07-23 | End: 2025-07-23

## 2025-07-23 RX ORDER — HEPARIN SODIUM 5000 [USP'U]/ML
5000 INJECTION, SOLUTION INTRAVENOUS; SUBCUTANEOUS ONCE
Status: COMPLETED | OUTPATIENT
Start: 2025-07-23 | End: 2025-07-23

## 2025-07-23 RX ORDER — OXYCODONE HYDROCHLORIDE 5 MG/1
10 TABLET ORAL EVERY 4 HOURS PRN
Status: DISCONTINUED | OUTPATIENT
Start: 2025-07-23 | End: 2025-07-26 | Stop reason: HOSPADM

## 2025-07-23 RX ORDER — MAGNESIUM SULFATE HEPTAHYDRATE 500 MG/ML
INJECTION, SOLUTION INTRAMUSCULAR; INTRAVENOUS AS NEEDED
Status: DISCONTINUED | OUTPATIENT
Start: 2025-07-23 | End: 2025-07-23

## 2025-07-23 RX ORDER — ONDANSETRON HYDROCHLORIDE 2 MG/ML
4 INJECTION, SOLUTION INTRAVENOUS EVERY 8 HOURS PRN
Status: DISCONTINUED | OUTPATIENT
Start: 2025-07-23 | End: 2025-07-26 | Stop reason: HOSPADM

## 2025-07-23 RX ORDER — HYDROMORPHONE HYDROCHLORIDE 1 MG/ML
INJECTION, SOLUTION INTRAMUSCULAR; INTRAVENOUS; SUBCUTANEOUS AS NEEDED
Status: DISCONTINUED | OUTPATIENT
Start: 2025-07-23 | End: 2025-07-23

## 2025-07-23 RX ORDER — FENTANYL CITRATE 50 UG/ML
INJECTION, SOLUTION INTRAMUSCULAR; INTRAVENOUS AS NEEDED
Status: DISCONTINUED | OUTPATIENT
Start: 2025-07-23 | End: 2025-07-23

## 2025-07-23 RX ORDER — NALOXONE HYDROCHLORIDE 0.4 MG/ML
0.2 INJECTION, SOLUTION INTRAMUSCULAR; INTRAVENOUS; SUBCUTANEOUS EVERY 5 MIN PRN
Status: DISCONTINUED | OUTPATIENT
Start: 2025-07-23 | End: 2025-07-26 | Stop reason: HOSPADM

## 2025-07-23 RX ORDER — MIDAZOLAM HYDROCHLORIDE 2 MG/2ML
INJECTION, SOLUTION INTRAMUSCULAR; INTRAVENOUS AS NEEDED
Status: DISCONTINUED | OUTPATIENT
Start: 2025-07-23 | End: 2025-07-23

## 2025-07-23 RX ORDER — AMLODIPINE BESYLATE 10 MG/1
10 TABLET ORAL DAILY
Status: DISCONTINUED | OUTPATIENT
Start: 2025-07-24 | End: 2025-07-26 | Stop reason: HOSPADM

## 2025-07-23 RX ORDER — LISINOPRIL 20 MG/1
40 TABLET ORAL DAILY
Status: DISCONTINUED | OUTPATIENT
Start: 2025-07-23 | End: 2025-07-25

## 2025-07-23 RX ORDER — ONDANSETRON HYDROCHLORIDE 2 MG/ML
4 INJECTION, SOLUTION INTRAVENOUS ONCE AS NEEDED
Status: COMPLETED | OUTPATIENT
Start: 2025-07-23 | End: 2025-07-23

## 2025-07-23 RX ORDER — PHENYLEPHRINE HCL IN 0.9% NACL 1 MG/10 ML
SYRINGE (ML) INTRAVENOUS AS NEEDED
Status: DISCONTINUED | OUTPATIENT
Start: 2025-07-23 | End: 2025-07-23

## 2025-07-23 RX ORDER — METRONIDAZOLE 500 MG/100ML
500 INJECTION, SOLUTION INTRAVENOUS ONCE
Status: COMPLETED | OUTPATIENT
Start: 2025-07-23 | End: 2025-07-23

## 2025-07-23 RX ORDER — PROCHLORPERAZINE EDISYLATE 5 MG/ML
5 INJECTION INTRAMUSCULAR; INTRAVENOUS ONCE AS NEEDED
Status: COMPLETED | OUTPATIENT
Start: 2025-07-23 | End: 2025-07-23

## 2025-07-23 RX ORDER — TALC
5 POWDER (GRAM) TOPICAL NIGHTLY PRN
Status: DISCONTINUED | OUTPATIENT
Start: 2025-07-23 | End: 2025-07-26 | Stop reason: HOSPADM

## 2025-07-23 RX ORDER — OXYCODONE HYDROCHLORIDE 5 MG/1
5 TABLET ORAL EVERY 4 HOURS PRN
Status: DISCONTINUED | OUTPATIENT
Start: 2025-07-23 | End: 2025-07-26 | Stop reason: HOSPADM

## 2025-07-23 RX ORDER — DEXMEDETOMIDINE IN 0.9 % NACL 20 MCG/5ML
SYRINGE (ML) INTRAVENOUS AS NEEDED
Status: DISCONTINUED | OUTPATIENT
Start: 2025-07-23 | End: 2025-07-23

## 2025-07-23 RX ORDER — OXYCODONE HYDROCHLORIDE 5 MG/1
5 TABLET ORAL EVERY 4 HOURS PRN
Status: DISCONTINUED | OUTPATIENT
Start: 2025-07-23 | End: 2025-07-23 | Stop reason: HOSPADM

## 2025-07-23 RX ORDER — ACETAMINOPHEN 325 MG/1
650 TABLET ORAL EVERY 4 HOURS PRN
Status: DISCONTINUED | OUTPATIENT
Start: 2025-07-23 | End: 2025-07-26 | Stop reason: HOSPADM

## 2025-07-23 RX ORDER — GLYCOPYRROLATE 0.2 MG/ML
INJECTION INTRAMUSCULAR; INTRAVENOUS AS NEEDED
Status: DISCONTINUED | OUTPATIENT
Start: 2025-07-23 | End: 2025-07-23

## 2025-07-23 RX ORDER — FENTANYL CITRATE 50 UG/ML
50 INJECTION, SOLUTION INTRAMUSCULAR; INTRAVENOUS EVERY 5 MIN PRN
Status: DISCONTINUED | OUTPATIENT
Start: 2025-07-23 | End: 2025-07-23 | Stop reason: HOSPADM

## 2025-07-23 RX ORDER — CEFAZOLIN SODIUM 2 G/50ML
2 SOLUTION INTRAVENOUS ONCE
Status: DISCONTINUED | OUTPATIENT
Start: 2025-07-23 | End: 2025-07-23 | Stop reason: HOSPADM

## 2025-07-23 RX ORDER — FENTANYL CITRATE 50 UG/ML
25 INJECTION, SOLUTION INTRAMUSCULAR; INTRAVENOUS EVERY 5 MIN PRN
Status: DISCONTINUED | OUTPATIENT
Start: 2025-07-23 | End: 2025-07-23 | Stop reason: HOSPADM

## 2025-07-23 RX ORDER — PROMETHAZINE HYDROCHLORIDE 25 MG/1
25 TABLET ORAL EVERY 6 HOURS PRN
Status: DISCONTINUED | OUTPATIENT
Start: 2025-07-23 | End: 2025-07-26 | Stop reason: HOSPADM

## 2025-07-23 RX ORDER — GABAPENTIN 300 MG/1
300 CAPSULE ORAL 3 TIMES DAILY
Status: DISCONTINUED | OUTPATIENT
Start: 2025-07-23 | End: 2025-07-26 | Stop reason: HOSPADM

## 2025-07-23 RX ORDER — ONDANSETRON HYDROCHLORIDE 2 MG/ML
INJECTION, SOLUTION INTRAVENOUS AS NEEDED
Status: DISCONTINUED | OUTPATIENT
Start: 2025-07-23 | End: 2025-07-23

## 2025-07-23 RX ORDER — PANTOPRAZOLE SODIUM 40 MG/10ML
20 INJECTION, POWDER, LYOPHILIZED, FOR SOLUTION INTRAVENOUS
Status: DISCONTINUED | OUTPATIENT
Start: 2025-07-23 | End: 2025-07-26 | Stop reason: HOSPADM

## 2025-07-23 RX ORDER — DEXTROMETHORPHAN HYDROBROMIDE, GUAIFENESIN 5; 100 MG/5ML; MG/5ML
650 LIQUID ORAL EVERY 8 HOURS PRN
COMMUNITY

## 2025-07-23 RX ORDER — FENTANYL CITRATE 50 UG/ML
12.5 INJECTION, SOLUTION INTRAMUSCULAR; INTRAVENOUS EVERY 5 MIN PRN
Status: DISCONTINUED | OUTPATIENT
Start: 2025-07-23 | End: 2025-07-23 | Stop reason: HOSPADM

## 2025-07-23 RX ORDER — SODIUM CHLORIDE, SODIUM LACTATE, POTASSIUM CHLORIDE, CALCIUM CHLORIDE 600; 310; 30; 20 MG/100ML; MG/100ML; MG/100ML; MG/100ML
100 INJECTION, SOLUTION INTRAVENOUS CONTINUOUS
Status: DISCONTINUED | OUTPATIENT
Start: 2025-07-23 | End: 2025-07-23 | Stop reason: HOSPADM

## 2025-07-23 RX ORDER — SODIUM CHLORIDE 0.9 G/100ML
INJECTION, SOLUTION IRRIGATION AS NEEDED
Status: DISCONTINUED | OUTPATIENT
Start: 2025-07-23 | End: 2025-07-23 | Stop reason: HOSPADM

## 2025-07-23 RX ADMIN — Medication 20 MG: at 14:07

## 2025-07-23 RX ADMIN — ENOXAPARIN SODIUM 40 MG: 100 INJECTION SUBCUTANEOUS at 20:04

## 2025-07-23 RX ADMIN — Medication 100 MCG: at 13:47

## 2025-07-23 RX ADMIN — Medication 8 MCG: at 14:19

## 2025-07-23 RX ADMIN — LISINOPRIL 40 MG: 20 TABLET ORAL at 20:05

## 2025-07-23 RX ADMIN — GABAPENTIN 300 MG: 300 CAPSULE ORAL at 20:05

## 2025-07-23 RX ADMIN — Medication 4 MCG: at 15:32

## 2025-07-23 RX ADMIN — ROCURONIUM BROMIDE 10 MG: 10 INJECTION, SOLUTION INTRAVENOUS at 15:39

## 2025-07-23 RX ADMIN — FENTANYL CITRATE 50 MCG: 50 INJECTION, SOLUTION INTRAMUSCULAR; INTRAVENOUS at 13:34

## 2025-07-23 RX ADMIN — PANTOPRAZOLE SODIUM 20 MG: 40 INJECTION, POWDER, FOR SOLUTION INTRAVENOUS at 20:04

## 2025-07-23 RX ADMIN — DEXAMETHASONE SODIUM PHOSPHATE 8 MG: 4 INJECTION, SOLUTION INTRAMUSCULAR; INTRAVENOUS at 14:02

## 2025-07-23 RX ADMIN — SODIUM CHLORIDE, POTASSIUM CHLORIDE, SODIUM LACTATE AND CALCIUM CHLORIDE: 600; 310; 30; 20 INJECTION, SOLUTION INTRAVENOUS at 13:37

## 2025-07-23 RX ADMIN — Medication 10 MG: at 15:18

## 2025-07-23 RX ADMIN — HEPARIN SODIUM 5000 UNITS: 5000 INJECTION, SOLUTION INTRAVENOUS; SUBCUTANEOUS at 11:02

## 2025-07-23 RX ADMIN — FENTANYL CITRATE 100 MCG: 50 INJECTION, SOLUTION INTRAMUSCULAR; INTRAVENOUS at 14:15

## 2025-07-23 RX ADMIN — LIDOCAINE HYDROCHLORIDE 100 MG: 20 INJECTION INTRAVENOUS at 13:47

## 2025-07-23 RX ADMIN — SODIUM CHLORIDE 40 ML/HR: 0.9 INJECTION, SOLUTION INTRAVENOUS at 20:11

## 2025-07-23 RX ADMIN — DROPERIDOL 0.62 MG: 2.5 INJECTION, SOLUTION INTRAMUSCULAR; INTRAVENOUS at 17:51

## 2025-07-23 RX ADMIN — ONDANSETRON 4 MG: 2 INJECTION, SOLUTION INTRAMUSCULAR; INTRAVENOUS at 17:24

## 2025-07-23 RX ADMIN — Medication 8 MCG: at 14:55

## 2025-07-23 RX ADMIN — PROCHLORPERAZINE EDISYLATE 5 MG: 5 INJECTION INTRAMUSCULAR; INTRAVENOUS at 17:44

## 2025-07-23 RX ADMIN — GLYCOPYRROLATE 0.1 MG: 0.2 INJECTION INTRAMUSCULAR; INTRAVENOUS at 13:34

## 2025-07-23 RX ADMIN — OXYCODONE HYDROCHLORIDE 10 MG: 5 TABLET ORAL at 20:59

## 2025-07-23 RX ADMIN — SODIUM CHLORIDE, POTASSIUM CHLORIDE, SODIUM LACTATE AND CALCIUM CHLORIDE: 600; 310; 30; 20 INJECTION, SOLUTION INTRAVENOUS at 15:48

## 2025-07-23 RX ADMIN — ROCURONIUM BROMIDE 100 MG: 10 INJECTION, SOLUTION INTRAVENOUS at 13:49

## 2025-07-23 RX ADMIN — Medication 10 MG: at 15:42

## 2025-07-23 RX ADMIN — SUGAMMADEX 200 MG: 100 INJECTION, SOLUTION INTRAVENOUS at 16:23

## 2025-07-23 RX ADMIN — PROPOFOL 200 MG: 10 INJECTION, EMULSION INTRAVENOUS at 13:48

## 2025-07-23 RX ADMIN — HYDROMORPHONE HYDROCHLORIDE 1 MG: 1 INJECTION, SOLUTION INTRAMUSCULAR; INTRAVENOUS; SUBCUTANEOUS at 16:09

## 2025-07-23 RX ADMIN — MIDAZOLAM HYDROCHLORIDE 2 MG: 1 INJECTION, SOLUTION INTRAMUSCULAR; INTRAVENOUS at 13:34

## 2025-07-23 RX ADMIN — ONDANSETRON 4 MG: 2 INJECTION, SOLUTION INTRAMUSCULAR; INTRAVENOUS at 15:52

## 2025-07-23 RX ADMIN — Medication 10 MG: at 14:43

## 2025-07-23 RX ADMIN — FENTANYL CITRATE 50 MCG: 50 INJECTION, SOLUTION INTRAMUSCULAR; INTRAVENOUS at 14:55

## 2025-07-23 RX ADMIN — METRONIDAZOLE 500 MG: 500 SOLUTION INTRAVENOUS at 14:02

## 2025-07-23 RX ADMIN — ROCURONIUM BROMIDE 20 MG: 10 INJECTION, SOLUTION INTRAVENOUS at 15:04

## 2025-07-23 RX ADMIN — MAGNESIUM SULFATE HEPTAHYDRATE 2 G: 500 INJECTION, SOLUTION INTRAMUSCULAR; INTRAVENOUS at 14:18

## 2025-07-23 RX ADMIN — CEFAZOLIN 2 G: 330 INJECTION, POWDER, FOR SOLUTION INTRAMUSCULAR; INTRAVENOUS at 14:04

## 2025-07-23 ASSESSMENT — ACTIVITIES OF DAILY LIVING (ADL)
PATIENT'S MEMORY ADEQUATE TO SAFELY COMPLETE DAILY ACTIVITIES?: YES
TOILETING: INDEPENDENT
WALKS IN HOME: INDEPENDENT
JUDGMENT_ADEQUATE_SAFELY_COMPLETE_DAILY_ACTIVITIES: YES
GROOMING: INDEPENDENT
DRESSING YOURSELF: INDEPENDENT
FEEDING YOURSELF: INDEPENDENT
BATHING: INDEPENDENT
HEARING - LEFT EAR: FUNCTIONAL
ADEQUATE_TO_COMPLETE_ADL: YES

## 2025-07-23 ASSESSMENT — PAIN SCALES - GENERAL
PAINLEVEL_OUTOF10: 0 - NO PAIN
PAINLEVEL_OUTOF10: 7
PAINLEVEL_OUTOF10: 0 - NO PAIN

## 2025-07-23 ASSESSMENT — LIFESTYLE VARIABLES
HOW OFTEN DO YOU HAVE A DRINK CONTAINING ALCOHOL: 4 OR MORE TIMES A WEEK
HOW MANY STANDARD DRINKS CONTAINING ALCOHOL DO YOU HAVE ON A TYPICAL DAY: 5 OR 6
AUDIT-C TOTAL SCORE: 10
HOW OFTEN DO YOU HAVE 6 OR MORE DRINKS ON ONE OCCASION: DAILY OR ALMOST DAILY
AUDIT-C TOTAL SCORE: 10
SKIP TO QUESTIONS 9-10: 0

## 2025-07-23 ASSESSMENT — PAIN - FUNCTIONAL ASSESSMENT
PAIN_FUNCTIONAL_ASSESSMENT: 0-10
PAIN_FUNCTIONAL_ASSESSMENT: UNABLE TO SELF-REPORT
PAIN_FUNCTIONAL_ASSESSMENT: UNABLE TO SELF-REPORT

## 2025-07-23 ASSESSMENT — COGNITIVE AND FUNCTIONAL STATUS - GENERAL
DRESSING REGULAR LOWER BODY CLOTHING: A LITTLE
MOBILITY SCORE: 22
CLIMB 3 TO 5 STEPS WITH RAILING: A LITTLE
DAILY ACTIVITIY SCORE: 22
WALKING IN HOSPITAL ROOM: A LITTLE
PATIENT BASELINE BEDBOUND: NO
DRESSING REGULAR UPPER BODY CLOTHING: A LITTLE

## 2025-07-23 ASSESSMENT — COLUMBIA-SUICIDE SEVERITY RATING SCALE - C-SSRS
1. IN THE PAST MONTH, HAVE YOU WISHED YOU WERE DEAD OR WISHED YOU COULD GO TO SLEEP AND NOT WAKE UP?: NO
6. HAVE YOU EVER DONE ANYTHING, STARTED TO DO ANYTHING, OR PREPARED TO DO ANYTHING TO END YOUR LIFE?: NO
2. HAVE YOU ACTUALLY HAD ANY THOUGHTS OF KILLING YOURSELF?: NO

## 2025-07-23 NOTE — ANESTHESIA POSTPROCEDURE EVALUATION
"Patient: Austen Eagle \"Bernardo\"    Procedure Summary       Date: 07/23/25 Room / Location: [facility] 06 / Virtual [facility]    Anesthesia Start: 1337 Anesthesia Stop: 1635    Procedure: Laparoscopic Right Colectomy; Ileocolic Anastomosis (Abdomen) Diagnosis:       Adenomatous polyp of ascending colon      (Adenomatous polyp of ascending colon [D12.2])    Surgeons: Harrison Dash MD Responsible Provider: Geovanny Winslow MD    Anesthesia Type: general ASA Status: 3            Anesthesia Type: general    Vitals Value Taken Time   /64 07/23/25 16:47   Temp 36.4 °C (97.5 °F) 07/23/25 16:45   Pulse 88 07/23/25 16:54   Resp 15 07/23/25 16:45   SpO2 97 % 07/23/25 16:54   Vitals shown include unfiled device data.    Anesthesia Post Evaluation    Patient location during evaluation: PACU  Patient participation: complete - patient participated  Level of consciousness: awake and alert  Pain management: adequate  Multimodal analgesia pain management approach  Airway patency: patent  Cardiovascular status: acceptable  Respiratory status: acceptable  Hydration status: acceptable  Postoperative Nausea and Vomiting: none        No notable events documented.    "

## 2025-07-23 NOTE — OP NOTE
"Laparoscopic Right Colectomy; Ileocolic Anastomosis Operative Note     Date: 2025  OR Location: [facility] OR    Name: Austen Eagle \"Bernardo\", : 1960, Age: 64 y.o., MRN: [id number], Sex: male    Diagnosis  Pre-op Diagnosis      * Adenomatous polyp of ascending colon [D12.2] Post-op Diagnosis     * Adenomatous polyp of ascending colon [D12.2]     Procedures  Laparoscopic Right Colectomy; Ileocolic Anastomosis  03276 - HI LAPAROSCOPY COLECTOMY PARTIAL W/ANASTOMOSIS      Surgeons      * Harrison Dash - Primary    Resident/Fellow/Other Assistant:  Surgeons and Role:  * No surgeons found with a matching role *    Staff:   Circulator: Karol Tineo Person: Mago Nicholas Circulator: Bernice Nicholas Scrub: Dalton    Anesthesia Staff: Anesthesiologist: Yonas Lucia MD; Geovanny Winslow MD  C-AA: ALICIA Brewster; ALICIA Lazaro    Procedure Summary  Anesthesia: General  ASA: III  Estimated Blood Loss: 20 mL  Intra-op Medications:   Administrations occurring from 1135 to 1435 on 25:   Medication Name Total Dose   ceFAZolin (Ancef) vial 1 g 2 g   dexAMETHasone (Decadron) 4 mg/mL IV Syringe 2 mL 8 mg   dexMEDETOMidine 4 mcg/mL in NS syringe 8 mcg   fentaNYL (Sublimaze) injection 50 mcg/mL 150 mcg   glycopyrrolate (Robinul) injection 0.1 mg   ketamine injection 50 mg/ 5 mL (10 mg/mL) 20 mg   LR bolus Cannot be calculated   lidocaine (cardiac) injection 2% prefilled syringe 100 mg   magnesium sulfate 50 % injection 2 g   midazolam PF (Versed) injection 1 mg/mL 2 mg   phenylephrine 100 mcg/mL syringe 10 mL (prefilled) 100 mcg   propofol (Diprivan) injection 10 mg/mL 200 mg   rocuronium (ZeMuron) 50 mg/5 mL injection 100 mg   metroNIDAZOLE (Flagyl) 500 mg in sodium chloride (iso)  mL 500 mg              Anesthesia Record               Intraprocedure I/O Totals          Intake    LR bolus 1300.00 mL    Total Intake 1300 mL       Output    Urine 500 mL    Est. Blood Loss 100 mL    Total Output 600 mL    " "   Net    Net Volume 700 mL          Specimen:   ID Type Source Tests Collected by Time   1 : RIGHT HEMICOLECTOMY Tissue COLON - RIGHT HEMICOLECTOMY SURGICAL PATHOLOGY EXAM Harrison Dash MD 7/23/2025 1447   A :  Blood Blood, Venous CARCINOEMBRYONIC ANTIGEN, TYPE AND SCREEN (Canceled) Harrison Dash MD 7/23/2025 1411   B :  Blood Blood, Venous TYPE AND SCREEN (Canceled) Harrison Dash MD 7/23/2025 1423                 Drains and/or Catheters:   [REMOVED] Urethral Catheter Non-latex 16 Fr. (Removed)       Tourniquet Times:         Implants:     Findings: Adhesions likely from prior diverticulitis    Indications: Austen Eagle \"Bernadro\" is an 64 y.o. male who is having surgery for Adenomatous polyp of ascending colon [D12.2].     The patient was seen in the preoperative area. The risks, benefits, complications, treatment options, non-operative alternatives, expected recovery and outcomes were discussed with the patient. The possibilities of reaction to medication, pulmonary aspiration, injury to surrounding structures, bleeding, recurrent infection, the need for additional procedures, failure to diagnose a condition, and creating a complication requiring transfusion or operation were discussed with the patient. The patient concurred with the proposed plan, giving informed consent.  The site of surgery was properly noted/marked if necessary per policy. The patient has been actively warmed in preoperative area. Preoperative antibiotics have been ordered and given within 1 hours of incision. Venous thrombosis prophylaxis have been ordered including bilateral sequential compression devices and chemical prophylaxis    Procedure Details:   Patient was identified in the preoperative area, transported to the operating room, and placed supine on the OR table.  General anesthesia was induced.  Arms were tucked and OG, Vides catheter, SCDs were placed.  Abdomen was prepped and draped in usual sterile fashion.  Time-out was " performed confirming patient, procedure, relevant perioperative criteria.  Veress needle was inserted at han's point with good drop test.  The abdomen was insufflated to 15 millimeters mercury.  5 millimeter Visiport was placed in the supraumbilical region.  Camera was inserted and no injury was identified.  5 millimeter ports were placed in the left upper and left lower quadrants under direct visualization.  Liver was evaluated with no obvious metastases.  There were midline flimsy adhesions likely from prior diverticulitis which were divided.  Patient was positioned and attention was turned to the cecum.  Small bowel was packed out of the way.  The cecum was elevated the ileocolic artery was located.  Overlying peritoneum was scored and dissection was initiated in a medial to lateral fashion.  The duodenum was identified and protected. The ileocolic artery was burned twice proximally and then divided once distally. Medial dissection was then completed.  Attention was then turned to the lateral attachments.  The white line of Toldt was taken down from the cecum up to the hepatic flexure.  The omentum overlying the transverse colon was then elevated.  The gastrocolic ligaments were divided and the lesser sac was entered.  Additional attachments were taken down until the entire hepatic flexure was mobilized.  Attention was turned to the terminal ileum and lateral attachments were divided freely mobilizing small intestine.  Once the right colon was freely mobile, the cecum was grasped with a laparoscopic Jn and the supraumbilical incision was extended around the umbilicus.  Tadeo wound retractor was placed and the right colon was then delivered through with a periumbilical incision.  A window was made in the terminal ileum and this was divided with a 75 millimeter blue load  GREGG stapler.  A window was made in the proximal transverse colon and this was divided.  Remaining mesentery was divided with energy  device .  Specimen was opened on the back table confirming the large ascending colon polyp was within it.  The ileum and transverse colon stapled ends were then aligned confirming there was no twisting of the mesentery and small enterotomies were made.  A blue load GREGG stapler was then passed down the lumens and fired creating a side-to-side functional end anastomosis.  A 3 0 silk crotch stitch was placed.  Common enterotomy was then elevated and closed with a blue load stapler.  Mesenteric window was closed with 3 0 Vicryl.  Omentum was then placed over the common staple line.  Anastomosis was placed back in the abdomen.  Abdomen was re-insufflated and the anastomosis was seen in the right upper quadrant laparoscopically without any twisting of the mesentery.  Four-quadrant laparoscopic tap block was performed with a total of 30 cc of half percent Marcaine with epinephrine.  Abdomen was irrigated. Gloves were changed.  Fascia was closed with a 2-0 PDS in a interrupted fashion.  Subcutaneous tissues were irrigated and closed with 3 0 Vicryl.  Skin and port sites were closed with 4 0 Monocryl.  Prior to complete closure all counts were confirmed correct.  Skin glue was then placed over the incisions.  Patient was extubated transported the PACU in stable condition.    Evidence of Infection: No   Complications:  None; patient tolerated the procedure well.    Disposition: PACU - hemodynamically stable.  Condition: stable     This procedure was not performed to treat colon cancer through resection              Additional Details:     Attending Attestation: I was present and scrubbed for the entire procedure.    Harrison Dash  Phone Number: [phone]

## 2025-07-23 NOTE — ANESTHESIA PROCEDURE NOTES
Airway  Date/Time: 7/23/2025 1:52 PM  Reason: elective    Airway not difficult    Staffing  Performed: ALICIA   Authorized by: Yonas Lucia MD    Performed by: ALICIA Lazaro  Patient location during procedure: OR    Patient Condition  Indications for airway management: anesthesia  Patient position: sniffing  MILS maintained throughout  Sedation level: deep     Final Airway Details   Preoxygenated: yes  Final airway type: endotracheal airway  Successful airway: ETT  Cuffed: yes   Successful intubation technique: direct laryngoscopy  Blade: Kurt  Blade size: #4  ETT size (mm): 7.5  Cormack-Lehane Classification: grade IIa - partial view of glottis  Placement verified by: chest auscultation and capnometry   Measured from: lips  ETT to lips (cm): 22  Number of attempts at approach: 1

## 2025-07-23 NOTE — ANESTHESIA PREPROCEDURE EVALUATION
"Patient: Austen Eagle \"Bernardo\"    Procedure Information       Date/Time: 07/23/25 1135    Procedure: Laparoscopic Right Colectomy; Ileocolic Anastomosis (Abdomen)    Location: [facility] OR 06 / [facility]    Surgeons: Harrison Dash MD            Relevant Problems   Cardiac   (+) HLD (hyperlipidemia)   (+) HTN (hypertension)   (+) PAC (premature atrial contraction)      Neuro   (+) Lumbar radiculopathy      Musculoskeletal   (+) OA (osteoarthritis)       Clinical information reviewed:   Tobacco  Allergies  Meds   Med Hx  Surg Hx   Fam Hx  Soc Hx        NPO Detail:  NPO/Void Status  Carbohydrate Drink Given Prior to Surgery? : N  Date of Last Liquid: 07/23/25  Time of Last Liquid: 0830  Date of Last Solid: 07/21/25  Time of Last Solid: 1900  Last Intake Type: Clear fluids; GI prep  Time of Last Void: 1053         Physical Exam    Airway  Mallampati: II  TM distance: >3 FB  Neck ROM: full  Mouth opening: 3 or more finger widths     Cardiovascular    Dental    Pulmonary    Abdominal            Anesthesia Plan    History of general anesthesia?: yes  History of complications of general anesthesia?: no    ASA 3     general     intravenous induction   Postoperative pain plan includes opioids.  Anesthetic plan and risks discussed with patient and spouse.    Plan discussed with CAA.      "

## 2025-07-24 LAB
ANION GAP SERPL CALC-SCNC: 15 MMOL/L (ref 10–20)
BUN SERPL-MCNC: 7 MG/DL (ref 6–23)
CALCIUM SERPL-MCNC: 8.5 MG/DL (ref 8.6–10.3)
CHLORIDE SERPL-SCNC: 106 MMOL/L (ref 98–107)
CO2 SERPL-SCNC: 21 MMOL/L (ref 21–32)
CREAT SERPL-MCNC: 0.59 MG/DL (ref 0.5–1.3)
EGFRCR SERPLBLD CKD-EPI 2021: >90 ML/MIN/1.73M*2
ERYTHROCYTE [DISTWIDTH] IN BLOOD BY AUTOMATED COUNT: 12.6 % (ref 11.5–14.5)
GLUCOSE SERPL-MCNC: 118 MG/DL (ref 74–99)
HCT VFR BLD AUTO: 37.5 % (ref 41–52)
HGB BLD-MCNC: 13 G/DL (ref 13.5–17.5)
MCH RBC QN AUTO: 32.8 PG (ref 26–34)
MCHC RBC AUTO-ENTMCNC: 34.7 G/DL (ref 32–36)
MCV RBC AUTO: 95 FL (ref 80–100)
NRBC BLD-RTO: 0 /100 WBCS (ref 0–0)
PLATELET # BLD AUTO: 224 X10*3/UL (ref 150–450)
POTASSIUM SERPL-SCNC: 4.2 MMOL/L (ref 3.5–5.3)
RBC # BLD AUTO: 3.96 X10*6/UL (ref 4.5–5.9)
SODIUM SERPL-SCNC: 138 MMOL/L (ref 136–145)
WBC # BLD AUTO: 10.6 X10*3/UL (ref 4.4–11.3)

## 2025-07-24 PROCEDURE — 36415 COLL VENOUS BLD VENIPUNCTURE: CPT | Performed by: SURGERY

## 2025-07-24 PROCEDURE — 2500000001 HC RX 250 WO HCPCS SELF ADMINISTERED DRUGS (ALT 637 FOR MEDICARE OP): Performed by: SURGERY

## 2025-07-24 PROCEDURE — 97165 OT EVAL LOW COMPLEX 30 MIN: CPT | Mod: GO | Performed by: OCCUPATIONAL THERAPIST

## 2025-07-24 PROCEDURE — 2500000004 HC RX 250 GENERAL PHARMACY W/ HCPCS (ALT 636 FOR OP/ED): Performed by: SURGERY

## 2025-07-24 PROCEDURE — 80048 BASIC METABOLIC PNL TOTAL CA: CPT | Performed by: SURGERY

## 2025-07-24 PROCEDURE — 85027 COMPLETE CBC AUTOMATED: CPT | Performed by: SURGERY

## 2025-07-24 PROCEDURE — 1100000001 HC PRIVATE ROOM DAILY

## 2025-07-24 RX ADMIN — LISINOPRIL 40 MG: 20 TABLET ORAL at 08:22

## 2025-07-24 RX ADMIN — ENOXAPARIN SODIUM 40 MG: 100 INJECTION SUBCUTANEOUS at 20:31

## 2025-07-24 RX ADMIN — ACETAMINOPHEN 650 MG: 325 TABLET ORAL at 15:59

## 2025-07-24 RX ADMIN — OXYCODONE HYDROCHLORIDE 10 MG: 5 TABLET ORAL at 12:41

## 2025-07-24 RX ADMIN — OXYCODONE HYDROCHLORIDE 10 MG: 5 TABLET ORAL at 02:57

## 2025-07-24 RX ADMIN — GABAPENTIN 300 MG: 300 CAPSULE ORAL at 20:31

## 2025-07-24 RX ADMIN — OXYCODONE HYDROCHLORIDE 10 MG: 5 TABLET ORAL at 16:57

## 2025-07-24 RX ADMIN — GABAPENTIN 300 MG: 300 CAPSULE ORAL at 08:22

## 2025-07-24 RX ADMIN — OXYCODONE HYDROCHLORIDE 10 MG: 5 TABLET ORAL at 20:40

## 2025-07-24 RX ADMIN — GABAPENTIN 300 MG: 300 CAPSULE ORAL at 15:58

## 2025-07-24 RX ADMIN — AMLODIPINE BESYLATE 10 MG: 10 TABLET ORAL at 08:22

## 2025-07-24 RX ADMIN — PANTOPRAZOLE SODIUM 20 MG: 40 INJECTION, POWDER, FOR SOLUTION INTRAVENOUS at 08:22

## 2025-07-24 RX ADMIN — OXYCODONE HYDROCHLORIDE 10 MG: 5 TABLET ORAL at 07:02

## 2025-07-24 SDOH — ECONOMIC STABILITY: FOOD INSECURITY: WITHIN THE PAST 12 MONTHS, YOU WORRIED THAT YOUR FOOD WOULD RUN OUT BEFORE YOU GOT MONEY TO BUY MORE.: NEVER TRUE

## 2025-07-24 SDOH — ECONOMIC STABILITY: FOOD INSECURITY: WITHIN THE PAST 12 MONTHS, YOU WORRIED THAT YOUR FOOD WOULD RUN OUT BEFORE YOU GOT THE MONEY TO BUY MORE.: NEVER TRUE

## 2025-07-24 SDOH — ECONOMIC STABILITY: FOOD INSECURITY: WITHIN THE PAST 12 MONTHS, THE FOOD YOU BOUGHT JUST DIDN'T LAST AND YOU DIDN'T HAVE MONEY TO GET MORE.: NEVER TRUE

## 2025-07-24 SDOH — ECONOMIC STABILITY: TRANSPORTATION INSECURITY

## 2025-07-24 SDOH — ECONOMIC STABILITY: INCOME INSECURITY: IN THE LAST 12 MONTHS, WAS THERE A TIME WHEN YOU WERE NOT ABLE TO PAY THE MORTGAGE OR RENT ON TIME?: YES

## 2025-07-24 SDOH — ECONOMIC STABILITY: TRANSPORTATION INSECURITY: IN THE PAST 12 MONTHS, HAS LACK OF TRANSPORTATION KEPT YOU FROM MEDICAL APPOINTMENTS OR FROM GETTING MEDICATIONS?: NO

## 2025-07-24 SDOH — ECONOMIC STABILITY: TRANSPORTATION INSECURITY
IN THE PAST 12 MONTHS, HAS THE LACK OF TRANSPORTATION KEPT YOU FROM MEDICAL APPOINTMENTS OR FROM GETTING MEDICATIONS?: NO

## 2025-07-24 SDOH — ECONOMIC STABILITY: HOUSING INSECURITY
IN THE LAST 12 MONTHS, WAS THERE A TIME WHEN YOU DID NOT HAVE A STEADY PLACE TO SLEEP OR SLEPT IN A SHELTER (INCLUDING NOW)?: NO

## 2025-07-24 SDOH — ECONOMIC STABILITY: HOUSING INSECURITY: IN THE LAST 12 MONTHS, HOW MANY PLACES HAVE YOU LIVED?: 1

## 2025-07-24 SDOH — ECONOMIC STABILITY: FOOD INSECURITY

## 2025-07-24 SDOH — ECONOMIC STABILITY: HOUSING INSECURITY: IN THE PAST 12 MONTHS HAS THE ELECTRIC, GAS, OIL, OR WATER COMPANY THREATENED TO SHUT OFF SERVICES IN YOUR HOME?: NO

## 2025-07-24 SDOH — ECONOMIC STABILITY: GENERAL

## 2025-07-24 SDOH — ECONOMIC STABILITY: TRANSPORTATION INSECURITY
IN THE PAST 12 MONTHS, HAS LACK OF TRANSPORTATION KEPT YOU FROM MEETINGS, WORK, OR FROM GETTING THINGS NEEDED FOR DAILY LIVING?: NO

## 2025-07-24 SDOH — ECONOMIC STABILITY: HOUSING INSECURITY: IN THE LAST 12 MONTHS, WAS THERE A TIME WHEN YOU WERE NOT ABLE TO PAY THE MORTGAGE OR RENT ON TIME?: YES

## 2025-07-24 SDOH — ECONOMIC STABILITY: HOUSING INSECURITY

## 2025-07-24 ASSESSMENT — COGNITIVE AND FUNCTIONAL STATUS - GENERAL
DAILY ACTIVITIY SCORE: 24
DRESSING REGULAR LOWER BODY CLOTHING: A LITTLE
DAILY ACTIVITIY SCORE: 24
DAILY ACTIVITIY SCORE: 23
MOBILITY SCORE: 24
MOBILITY SCORE: 24

## 2025-07-24 ASSESSMENT — PAIN SCALES - GENERAL
PAINLEVEL_OUTOF10: 0 - NO PAIN
PAINLEVEL_OUTOF10: 8
PAINLEVEL_OUTOF10: 8
PAINLEVEL_OUTOF10: 4
PAINLEVEL_OUTOF10: 8
PAINLEVEL_OUTOF10: 3
PAINLEVEL_OUTOF10: 7
PAINLEVEL_OUTOF10: 0 - NO PAIN
PAINLEVEL_OUTOF10: 7
PAINLEVEL_OUTOF10: 7

## 2025-07-24 ASSESSMENT — PAIN - FUNCTIONAL ASSESSMENT
PAIN_FUNCTIONAL_ASSESSMENT: 0-10

## 2025-07-24 ASSESSMENT — SOCIAL DETERMINANTS OF HEALTH (SDOH): IN THE PAST 12 MONTHS, HAS THE ELECTRIC, GAS, OIL, OR WATER COMPANY THREATENED TO SHUT OFF SERVICE IN YOUR HOME?: NO

## 2025-07-24 ASSESSMENT — ACTIVITIES OF DAILY LIVING (ADL)
LACK_OF_TRANSPORTATION: NO
ADL_ASSISTANCE: INDEPENDENT

## 2025-07-24 ASSESSMENT — PAIN DESCRIPTION - LOCATION: LOCATION: SHOULDER

## 2025-07-24 ASSESSMENT — PAIN DESCRIPTION - ORIENTATION: ORIENTATION: LEFT

## 2025-07-24 NOTE — DISCHARGE INSTRUCTIONS
Instructions After Laparoscopic Surgery    Wound Care:  - Ok to shower in 24 hours  - No baths or swimming for 2 weeks  - Keep wound clean and dry  - Do not apply topical creams or ointments  - Call if you notice redness around wound, foul-smelliing drainage, or increasing pain     Diet:          - GI soft, low residual     Activity          - Take it easy for the first 48 hours          - Stairs and walks are fine          - Resume activities gradually over the first week          - Ask Dr. Dash before resuming strenuous physical exertions          - No driving while on pain medication    Medications          - Pain medicine prescription attached for severe pain, if needed          - Resume your home medications unless otherwise directed          - Oxycodone 5mg every 6 hours as needed for pain    Other Instructions          - Call the doctor for the following:   Severe unrelieved pain   Fevers > 101F   Nausea/vomiting   Wound issues   Insurance/return to work forms   Shortness of breath   Chest pains    Other Instructions:  It is important to drink adequate fluid and avoid dehydration. Signs of dehydration include dark urine, decreased frequency in urine, pale mucous membrane, or dry mucous membranes. You should drink at least 8 8oz glasses of fluids a day and it should be a variety of fluids (water, juice, tea, coffee, etc.).  If you start to experience nausea, emesis, fever, or abdominal pain please call Dr. Dash's office [phone].

## 2025-07-24 NOTE — POST-PROCEDURE NOTE
Mr. Eagle is a 64 year old male who is POD #0 from a laparoscopic right colectomy with ileocolic anastomosis (Intraoperative Findings: Adhesions likely from prior diverticulitis). He reports adequate pain control. Denies any current nausea or vomiting. He has urinated since OR. He has not yet passed gas or had a BM since OR.    PE:  Constitutional: A&Ox3, calm and cooperative, NAD.  Eyes: PERRL, clear sclera.  ENMT: Moist mucous membranes.  Head/Neck: Neck supple.  Cardiovascular: Normal rate.  Respiratory/Thorax: Unlabored breathing.  Gastrointestinal: Abdomen slightly distended, soft, appropriately tender to palpation, no peritoneal signs, lap sites c/d/i, well approximated with Dermabond, no erythema or drainage.  Genitourinary: Voiding independently without difficulty.  Musculoskeletal: ROM intact.  Neurological: A&Ox3, No focal deficits.  Psychological: Appropriate mood and behavior.  Skin: Warm and dry.    Radiology and labs reviewed. VSS, afebrile.    Plan:   - Diet: CLD  - IVF  - Continue current pain regimen   - PRN antiemetic   - DVT Proph: SCDs/ ambulate/ Lovenox  - Monitor VS every 4 hours   - Labs ordered for AM   - IS every hour while awake   - Encourage ambulation / OOB as tolerated   - Monitor lap sites for drainage, erythema, excessive bruising   - Daily weight  - PT/OT  - F/u with Dr. Dash outpatient once d/c from hospital     Dispo: Pain and nausea control as needed. OOB as able ARBF.     Total time spent 25 minutes, and greater than 50% of time was spent in counseling/coordination of care.

## 2025-07-24 NOTE — PROGRESS NOTES
"Physical Therapy                 Therapy Communication Note    Patient Name: Austen Eagle \"Bernardo\"  MRN: [id number]  Department: [place] [id number]  Room: [address] [place] [address]  Today's Date: 7/24/2025     Discipline: Physical Therapy    Missed Visit:       Missed Visit Reason: Missed Visit Reason:  (Per discussion with OT, pt ambulating independently in the halls. Pt is at his baseline with no acute PT needs. PT orders to be discharged.)    Missed Time: Screen    Comment:      "

## 2025-07-24 NOTE — PROGRESS NOTES
"Occupational Therapy    Evaluation & DC     Patient Name: Austen Eagle \"Bernardo\"  MRN: [id number]  Department: [place] [address]  Room: [address] [place] [address]  Today's Date: 7/24/2025  Time Calculation  Start Time: 1128  Stop Time: 1146  Time Calculation (min): 18 min        Assessment:  OT Assessment: Pt presents to OT this date s/p recent abdominal surgery with abdominal precautions and is near baseline level of functioning for ADLs and functional mobility/transfers. Pt and spouse very recpetive to all education on abdominal precautions including log roll for bed mobility and compensatory techniques/AE for LB ADLs. Pt does not demo or verbalize any skilled acute or post acute OT needs, will D/C from OT caseload.  Prognosis: Excellent  Barriers to Discharge Home: No anticipated barriers  Evaluation/Treatment Tolerance: Patient tolerated treatment well  Medical Staff Made Aware: Yes  End of Session Communication: Bedside nurse  End of Session Patient Position: Up in room (with spouse in room, per RN pt cleared to be up ad clarisse)  Prognosis: Excellent  Barriers to Discharge: None  Evaluation/Treatment Tolerance: Patient tolerated treatment well  Medical Staff Made Aware: Yes  Strengths: Premorbid level of function  Plan:  No Skilled OT: No acute OT goals identified  OT Frequency: OT eval only  OT Discharge Recommendations: No further acute OT, No OT needed after discharge  Equipment Recommended upon Discharge:  (reacher, sock aid, shower bench)  OT Recommended Transfer Status: Independent  OT - OK to Discharge: Yes       Subjective     OT Visit Info:  OT Received On: 07/24/25  General:  General  Reason for Referral: Pt is a 63 y/o male POD#1 laparoscopic right colectomy with ileocolic anastomosis.  Referred By: Hardy AVERY)  Past Medical History Relevant to Rehab: Past Medical History:  Diagnosis Date    Adenomatous polyp of ascending colon     Colon polyp 2010    Diverticulitis of colon 2010    Diverticulosis 6/2010    Gout     History of heavy " alcohol consumption     6 pack daily    Hyperlipidemia     Hypertension 2010    Lumbar radiculopathy     PAC (premature atrial contraction)     Positive colorectal cancer screening using Cologuard test     11/2024      Family/Caregiver Present: Yes  Caregiver Feedback: pt's spouse present and supportive  Prior to Session Communication: Bedside nurse  Patient Position Received: Bed, 3 rail up, Alarm off, not on at start of session  Preferred Learning Style: auditory, verbal, visual, written  General Comment: Pt supine in bed upon arrival and agreeable to OT Eval. Pt and spouse very receptive to education on abdominal precautions and AE/DME recommendations. Pt fully participatory.  Precautions:  Medical Precautions: Fall precautions, Abdominal precautions  Post-Surgical Precautions: Abdominal surgery precautions          Pain:  Pain Assessment  Pain Assessment: 0-10  0-10 (Numeric) Pain Score: 7  Pain Type: Surgical pain, Acute pain  Pain Location: Abdomen (+ L shoulder)  Pain Interventions: Repositioned, Ambulation/increased activity  Response to Interventions: No change in pain    Objective   Cognition:  Overall Cognitive Status: Within Functional Limits  Orientation Level: Oriented X4  Attention: Within Functional Limits  Memory: Within Funtional Limits  Safety/Judgement: Within Functional Limits  Insight: Within function limits  Impulsive: Within functional limits  Processing Speed: Within funtional limits           Home Living:  Type of Home: House  Lives With: Spouse  Home Adaptive Equipment: Crutches  Home Layout: One level  Home Access: Stairs to enter without rails  Entrance Stairs-Rails: None  Entrance Stairs-Number of Steps: 1  Bathroom Shower/Tub: Tub/shower unit  Bathroom Toilet: Standard  Bathroom Equipment: Grab bars in shower  Prior Function:  Level of [place]: Independent with ADLs and functional transfers, Independent with homemaking with ambulation  Receives Help From: Family (spouse)  ADL  Assistance: Independent  Homemaking Assistance: Independent (shares with spouse)  Ambulatory Assistance: Independent  Hand Dominance: Right  Prior Function Comments: pt denies recent falls. + driving       ADL:  Eating Assistance: Independent (anticipated)  Grooming Assistance: Independent (anticipated)  UE Dressing Assistance: Independent (anticipated)  LE Dressing Assistance: Modified independent (Device)  LE Dressing Deficit:  (to don/doff socks using AE, pt receptive to education on compensatory techniques and use of AE for LB dressing tasks to adhere to abdominal precautions)  Toileting Assistance with Device:  (pt declines at this time however educated pt on compensatory techniques for azael hygiene to maintain abdominal precautions, pt receptive to education)  Activity Tolerance:  Endurance: Endurance does not limit participation in activity  Bed Mobility/Transfers: Bed Mobility  Bed Mobility: Yes  Bed Mobility 1  Bed Mobility 1: Supine to sitting, Sitting to supine  Level of Assistance 1: Close supervision, Modified independent  Bed Mobility Comments 1: x2 trials, initially requires cues for log roll technique, progressed to Mod I, HOB flat, pt very receptive to log roll technique    Transfers  Transfer: Yes  Transfer 1  Technique 1: Sit to stand, Stand to sit  Transfer Level of Assistance 1: Independent  Trials/Comments 1: from EOB      Functional Mobility:  Functional Mobility  Functional Mobility Performed: Yes  Functional Mobility 1  Comments 1: Pt functionally navigated x min household distance in room independently using no AD, no LOB Noted.  Sitting Balance:  Static Sitting Balance  Static Sitting-Balance Support: Bilateral upper extremity supported, Feet supported  Static Sitting-Level of Assistance: Independent  Static Sitting-Comment/Number of Minutes: at EOB  Dynamic Sitting Balance  Dynamic Sitting-Comments: Independent at EOB  Standing Balance:  Static Standing Balance  Static Standing-Balance  Support: No upper extremity supported  Static Standing-Level of Assistance: Independent  Dynamic Standing Balance  Dynamic Standing-Comments: Independent        Vision:Vision - Basic Assessment  Current Vision: Wears glasses all the time  Sensation:  Light Touch: No apparent deficits  Strength:  Strength Comments: B UEs grossly 3/5 based on function, not formally assessed d/t abdominal precautions       Coordination:  Movements are Fluid and Coordinated: Yes     Extremities: RUE   RUE : Within Functional Limits and LUE   LUE: Within Functional Limits      Outcome Measures:[facility] Daily Activity  Putting on and taking off regular lower body clothing: None  Bathing (including washing, rinsing, drying): None  Putting on and taking off regular upper body clothing: None  Toileting, which includes using toilet, bedpan or urinal: None  Taking care of personal grooming such as brushing teeth: None  Eating Meals: None  Daily Activity - Total Score: 24        Education Documentation  Handouts, taught by Romelia Vogel OT at 7/24/2025 12:07 PM.  Learner: Significant Other, Patient  Readiness: Acceptance  Method: Explanation, Demonstration, Handout  Response: Verbalizes Understanding  Comment: pt educated on abdominal precautions including log roll for bed mobility, compensatory techniques for ADLs, AE/DME for safe homegoing, etc.    Body Mechanics, taught by Romelia Vogel OT at 7/24/2025 12:07 PM.  Learner: Significant Other, Patient  Readiness: Acceptance  Method: Explanation, Demonstration, Handout  Response: Verbalizes Understanding  Comment: pt educated on abdominal precautions including log roll for bed mobility, compensatory techniques for ADLs, AE/DME for safe homegoing, etc.    Precautions, taught by Romelia Vogel OT at 7/24/2025 12:07 PM.  Learner: Significant Other, Patient  Readiness: Acceptance  Method: Explanation, Demonstration, Handout  Response: Verbalizes Understanding  Comment: pt educated on abdominal  precautions including log roll for bed mobility, compensatory techniques for ADLs, AE/DME for safe homegoing, etc.    ADL Training, taught by Romelia Vogel OT at 7/24/2025 12:07 PM.  Learner: Significant Other, Patient  Readiness: Acceptance  Method: Explanation, Demonstration, Handout  Response: Verbalizes Understanding  Comment: pt educated on abdominal precautions including log roll for bed mobility, compensatory techniques for ADLs, AE/DME for safe homegoing, etc.

## 2025-07-24 NOTE — CARE PLAN
The patient's goals for the shift include      The clinical goals for the shift include pain control    Problem: Pain - Adult  Goal: Verbalizes/displays adequate comfort level or baseline comfort level  Outcome: Progressing     Problem: Safety - Adult  Goal: Free from fall injury  Outcome: Progressing     Problem: Discharge Planning  Goal: Discharge to home or other facility with appropriate resources  Outcome: Progressing     Problem: Chronic Conditions and Co-morbidities  Goal: Patient's chronic conditions and co-morbidity symptoms are monitored and maintained or improved  Outcome: Progressing     Problem: Nutrition  Goal: Nutrient intake appropriate for maintaining nutritional needs  Outcome: Progressing

## 2025-07-24 NOTE — PROGRESS NOTES
"Austen Eagle \"Bernardo\" is a 64 y.o. male on day 1 of admission presenting with Adenomatous polyp of ascending colon.    Assessment/Plan   POD1 laparoscopic right colectomy with ileocolic anastomosis with Dr. Dash (Findings: Adhesions likely from prior diverticulitis)    GI: Abdomen taut, distended, mildly tender, lap sites C/D/I with dermabond, well approximated w/o surrounding erythema or drainage  - Hx: colon polyp, diverticulosis, positive colorectal cancer screening  - CLD - may advance diet later today if RBF  - PRN antiemetics  - PPI    Neuro: post-surgical pain, currently well managed   - Continue current pain regimen  - OOB/ ambulate 5x per day  - Scheduled gabapentin  - PRN acetaminophen, oxy, dilaudid    CV: RRR, BP stable  - Hx: HTN, HLD, PAC  - VS every 4 hours   - lisinopril, amlodipine    Resp: CTAB, on RA  - IS Q 10x/hour while awake   - Pulse ox every 4 hours with VS    :    - Cr. 0.59  - Monitor I and Os  - IVF    Heme:   - H/H 13.0/37.5  - DVT proph: SCDs/Lovenox    Endo:   -     Musculoskeletal  - Hx : gout, lumbar radiculopathy    ID: Afebrile  - WBC 10.6  - Monitor for s/s of infection     Discussed with Dr. Dash. ARBF.       Subjective   Feeling bloated, no n/v. -flatus. Pain controlled.       Objective     Physical Exam  Constitutional: A&Ox3, calm and cooperative, NAD  Eyes: EOMI, clear sclera   Cardiovascular: Normal rate and regular rhythm. No murmurs  Respiratory/Thorax: CTAB, on RA  Gastrointestinal: Abdomen taut, distended, mildly tender, lap sites C/D/I with dermabond, well approximated w/o surrounding erythema or drainage  Genitourinary: Voiding independently   Musculoskeletal: ROM intact  Extremities: No peripheral edema  Neurological: A&Ox3, No focal deficits   Psychological: Appropriate mood and behavior    Last Recorded Vitals  Blood pressure 141/81, pulse 88, temperature 37 °C (98.6 °F), temperature source Temporal, resp. rate 17, height 1.803 m (5' 11\"), weight 83.3 kg " (183 lb 9.6 oz), SpO2 96%.  Intake/Output last 3 Shifts:  I/O last 3 completed shifts:  In: 1300 (15.6 mL/kg) [IV Piggyback:1300]  Out: 1000 (12 mL/kg) [Urine:900 (0.3 mL/kg/hr); Blood:100]  Weight: 83.3 kg     Relevant Results    Scheduled medications  Scheduled Medications[1]  Continuous medications  Continuous Medications[2]  PRN medications  PRN Medications[3]    Results for orders placed or performed during the hospital encounter of 07/23/25 (from the past 24 hours)   Carcinoembryonic Antigen   Result Value Ref Range    Carcinoembryonic AG 0.7 ug/L   Basic Metabolic Panel   Result Value Ref Range    Glucose 118 (H) 74 - 99 mg/dL    Sodium 138 136 - 145 mmol/L    Potassium 4.2 3.5 - 5.3 mmol/L    Chloride 106 98 - 107 mmol/L    Bicarbonate 21 21 - 32 mmol/L    Anion Gap 15 10 - 20 mmol/L    Urea Nitrogen 7 6 - 23 mg/dL    Creatinine 0.59 0.50 - 1.30 mg/dL    eGFR >90 >60 mL/min/1.73m*2    Calcium 8.5 (L) 8.6 - 10.3 mg/dL   CBC   Result Value Ref Range    WBC 10.6 4.4 - 11.3 x10*3/uL    nRBC 0.0 0.0 - 0.0 /100 WBCs    RBC 3.96 (L) 4.50 - 5.90 x10*6/uL    Hemoglobin 13.0 (L) 13.5 - 17.5 g/dL    Hematocrit 37.5 (L) 41.0 - 52.0 %    MCV 95 80 - 100 fL    MCH 32.8 26.0 - 34.0 pg    MCHC 34.7 32.0 - 36.0 g/dL    RDW 12.6 11.5 - 14.5 %    Platelets 224 150 - 450 x10*3/uL       No orders to display          I spent 35 minutes in the professional and overall care of this patient.    Alexander Rodgers, APRN-CNP         [1] amLODIPine, 10 mg, oral, Daily  enoxaparin, 40 mg, subcutaneous, q24h  gabapentin, 300 mg, oral, TID  lisinopril, 40 mg, oral, Daily  pantoprazole, 20 mg, intravenous, q24h JACOB  [2] sodium chloride 0.9%, 40 mL/hr, Last Rate: 40 mL/hr (07/23/25 2011)  [3] PRN medications: acetaminophen, HYDROmorphone, melatonin, naloxone, ondansetron ODT **OR** ondansetron, oxyCODONE, oxyCODONE, oxygen, promethazine **OR** promethazine

## 2025-07-25 LAB
ANION GAP SERPL CALC-SCNC: 15 MMOL/L (ref 10–20)
BUN SERPL-MCNC: 8 MG/DL (ref 6–23)
CALCIUM SERPL-MCNC: 8.9 MG/DL (ref 8.6–10.3)
CHLORIDE SERPL-SCNC: 104 MMOL/L (ref 98–107)
CO2 SERPL-SCNC: 23 MMOL/L (ref 21–32)
CREAT SERPL-MCNC: 0.66 MG/DL (ref 0.5–1.3)
EGFRCR SERPLBLD CKD-EPI 2021: >90 ML/MIN/1.73M*2
ERYTHROCYTE [DISTWIDTH] IN BLOOD BY AUTOMATED COUNT: 12.8 % (ref 11.5–14.5)
GLUCOSE SERPL-MCNC: 99 MG/DL (ref 74–99)
HCT VFR BLD AUTO: 40.8 % (ref 41–52)
HGB BLD-MCNC: 13.8 G/DL (ref 13.5–17.5)
MCH RBC QN AUTO: 32.2 PG (ref 26–34)
MCHC RBC AUTO-ENTMCNC: 33.8 G/DL (ref 32–36)
MCV RBC AUTO: 95 FL (ref 80–100)
NRBC BLD-RTO: 0 /100 WBCS (ref 0–0)
PLATELET # BLD AUTO: 226 X10*3/UL (ref 150–450)
POTASSIUM SERPL-SCNC: 3.7 MMOL/L (ref 3.5–5.3)
RBC # BLD AUTO: 4.29 X10*6/UL (ref 4.5–5.9)
SODIUM SERPL-SCNC: 138 MMOL/L (ref 136–145)
WBC # BLD AUTO: 11.1 X10*3/UL (ref 4.4–11.3)

## 2025-07-25 PROCEDURE — 2500000004 HC RX 250 GENERAL PHARMACY W/ HCPCS (ALT 636 FOR OP/ED): Performed by: SURGERY

## 2025-07-25 PROCEDURE — 85027 COMPLETE CBC AUTOMATED: CPT | Performed by: SURGERY

## 2025-07-25 PROCEDURE — 1100000001 HC PRIVATE ROOM DAILY

## 2025-07-25 PROCEDURE — 36415 COLL VENOUS BLD VENIPUNCTURE: CPT | Performed by: SURGERY

## 2025-07-25 PROCEDURE — 80048 BASIC METABOLIC PNL TOTAL CA: CPT | Performed by: SURGERY

## 2025-07-25 PROCEDURE — 2500000001 HC RX 250 WO HCPCS SELF ADMINISTERED DRUGS (ALT 637 FOR MEDICARE OP): Performed by: SURGERY

## 2025-07-25 RX ORDER — LISINOPRIL 20 MG/1
40 TABLET ORAL DAILY
Status: DISCONTINUED | OUTPATIENT
Start: 2025-07-26 | End: 2025-07-26 | Stop reason: HOSPADM

## 2025-07-25 RX ADMIN — GABAPENTIN 300 MG: 300 CAPSULE ORAL at 15:42

## 2025-07-25 RX ADMIN — ENOXAPARIN SODIUM 40 MG: 100 INJECTION SUBCUTANEOUS at 20:47

## 2025-07-25 RX ADMIN — OXYCODONE HYDROCHLORIDE 10 MG: 5 TABLET ORAL at 07:25

## 2025-07-25 RX ADMIN — PANTOPRAZOLE SODIUM 20 MG: 40 INJECTION, POWDER, FOR SOLUTION INTRAVENOUS at 08:44

## 2025-07-25 RX ADMIN — GABAPENTIN 300 MG: 300 CAPSULE ORAL at 20:47

## 2025-07-25 RX ADMIN — OXYCODONE HYDROCHLORIDE 10 MG: 5 TABLET ORAL at 03:23

## 2025-07-25 RX ADMIN — GABAPENTIN 300 MG: 300 CAPSULE ORAL at 08:44

## 2025-07-25 SDOH — ECONOMIC STABILITY: FOOD INSECURITY: WITHIN THE PAST 12 MONTHS, THE FOOD YOU BOUGHT JUST DIDN'T LAST AND YOU DIDN'T HAVE MONEY TO GET MORE.: NEVER TRUE

## 2025-07-25 SDOH — SOCIAL STABILITY: SOCIAL INSECURITY: DO YOU FEEL ANYONE HAS EXPLOITED OR TAKEN ADVANTAGE OF YOU FINANCIALLY OR OF YOUR PERSONAL PROPERTY?: NO

## 2025-07-25 SDOH — ECONOMIC STABILITY: HOUSING INSECURITY: IN THE LAST 12 MONTHS, WAS THERE A TIME WHEN YOU WERE NOT ABLE TO PAY THE MORTGAGE OR RENT ON TIME?: NO

## 2025-07-25 SDOH — ECONOMIC STABILITY: FOOD INSECURITY: WITHIN THE PAST 12 MONTHS, YOU WORRIED THAT YOUR FOOD WOULD RUN OUT BEFORE YOU GOT THE MONEY TO BUY MORE.: NEVER TRUE

## 2025-07-25 SDOH — SOCIAL STABILITY: SOCIAL INSECURITY: HAVE YOU HAD ANY THOUGHTS OF HARMING ANYONE ELSE?: NO

## 2025-07-25 SDOH — ECONOMIC STABILITY: HOUSING INSECURITY: AT ANY TIME IN THE PAST 12 MONTHS, WERE YOU HOMELESS OR LIVING IN A SHELTER (INCLUDING NOW)?: NO

## 2025-07-25 SDOH — SOCIAL STABILITY: SOCIAL INSECURITY: DO YOU FEEL UNSAFE GOING BACK TO THE PLACE WHERE YOU ARE LIVING?: NO

## 2025-07-25 SDOH — ECONOMIC STABILITY: HOUSING INSECURITY: IN THE PAST 12 MONTHS, HOW MANY TIMES HAVE YOU MOVED WHERE YOU WERE LIVING?: 0

## 2025-07-25 SDOH — SOCIAL STABILITY: SOCIAL INSECURITY: HAVE YOU HAD THOUGHTS OF HARMING ANYONE ELSE?: NO

## 2025-07-25 SDOH — ECONOMIC STABILITY: FOOD INSECURITY: HOW HARD IS IT FOR YOU TO PAY FOR THE VERY BASICS LIKE FOOD, HOUSING, MEDICAL CARE, AND HEATING?: NOT VERY HARD

## 2025-07-25 SDOH — SOCIAL STABILITY: SOCIAL INSECURITY
WITHIN THE LAST YEAR, HAVE YOU BEEN KICKED, HIT, SLAPPED, OR OTHERWISE PHYSICALLY HURT BY YOUR PARTNER OR EX-PARTNER?: NO

## 2025-07-25 SDOH — ECONOMIC STABILITY: INCOME INSECURITY: IN THE PAST 12 MONTHS HAS THE ELECTRIC, GAS, OIL, OR WATER COMPANY THREATENED TO SHUT OFF SERVICES IN YOUR HOME?: NO

## 2025-07-25 SDOH — SOCIAL STABILITY: SOCIAL INSECURITY: WITHIN THE LAST YEAR, HAVE YOU BEEN AFRAID OF YOUR PARTNER OR EX-PARTNER?: NO

## 2025-07-25 SDOH — SOCIAL STABILITY: SOCIAL INSECURITY
WITHIN THE LAST YEAR, HAVE YOU BEEN RAPED OR FORCED TO HAVE ANY KIND OF SEXUAL ACTIVITY BY YOUR PARTNER OR EX-PARTNER?: NO

## 2025-07-25 SDOH — SOCIAL STABILITY: SOCIAL INSECURITY: WITHIN THE LAST YEAR, HAVE YOU BEEN HUMILIATED OR EMOTIONALLY ABUSED IN OTHER WAYS BY YOUR PARTNER OR EX-PARTNER?: NO

## 2025-07-25 SDOH — SOCIAL STABILITY: SOCIAL INSECURITY: ABUSE: ADULT

## 2025-07-25 SDOH — SOCIAL STABILITY: SOCIAL INSECURITY
ASK PARENT OR GUARDIAN: ARE THERE TIMES WHEN YOU, YOUR CHILD(REN), OR ANY MEMBER OF YOUR HOUSEHOLD FEEL UNSAFE, HARMED, OR THREATENED AROUND PERSONS WITH WHOM YOU KNOW OR LIVE?: NO

## 2025-07-25 SDOH — ECONOMIC STABILITY: TRANSPORTATION INSECURITY: IN THE PAST 12 MONTHS, HAS LACK OF TRANSPORTATION KEPT YOU FROM MEDICAL APPOINTMENTS OR FROM GETTING MEDICATIONS?: NO

## 2025-07-25 SDOH — SOCIAL STABILITY: SOCIAL INSECURITY: HAS ANYONE EVER THREATENED TO HURT YOUR FAMILY OR YOUR PETS?: NO

## 2025-07-25 SDOH — SOCIAL STABILITY: SOCIAL INSECURITY: WERE YOU ABLE TO COMPLETE ALL THE BEHAVIORAL HEALTH SCREENINGS?: YES

## 2025-07-25 SDOH — SOCIAL STABILITY: SOCIAL INSECURITY: ARE YOU OR HAVE YOU BEEN THREATENED OR ABUSED PHYSICALLY, EMOTIONALLY, OR SEXUALLY BY ANYONE?: NO

## 2025-07-25 SDOH — ECONOMIC STABILITY: HOUSING INSECURITY: DO YOU FEEL UNSAFE GOING BACK TO THE PLACE WHERE YOU LIVE?: NO

## 2025-07-25 SDOH — SOCIAL STABILITY: SOCIAL INSECURITY: DOES ANYONE TRY TO KEEP YOU FROM HAVING/CONTACTING OTHER FRIENDS OR DOING THINGS OUTSIDE YOUR HOME?: NO

## 2025-07-25 SDOH — SOCIAL STABILITY: SOCIAL INSECURITY: ARE THERE ANY APPARENT SIGNS OF INJURIES/BEHAVIORS THAT COULD BE RELATED TO ABUSE/NEGLECT?: NO

## 2025-07-25 ASSESSMENT — ACTIVITIES OF DAILY LIVING (ADL)
PATIENT'S MEMORY ADEQUATE TO SAFELY COMPLETE DAILY ACTIVITIES?: YES
DRESSING YOURSELF: INDEPENDENT
JUDGMENT_ADEQUATE_SAFELY_COMPLETE_DAILY_ACTIVITIES: YES
LACK_OF_TRANSPORTATION: NO
HEARING - LEFT EAR: FUNCTIONAL
FEEDING YOURSELF: INDEPENDENT
TOILETING: INDEPENDENT
LACK_OF_TRANSPORTATION: NO
ADEQUATE_TO_COMPLETE_ADL: YES
GROOMING: INDEPENDENT
WALKS IN HOME: INDEPENDENT
BATHING: INDEPENDENT
HEARING - RIGHT EAR: FUNCTIONAL

## 2025-07-25 ASSESSMENT — COGNITIVE AND FUNCTIONAL STATUS - GENERAL
DAILY ACTIVITIY SCORE: 24
MOBILITY SCORE: 24
MOBILITY SCORE: 24
DAILY ACTIVITIY SCORE: 24

## 2025-07-25 ASSESSMENT — PAIN SCALES - GENERAL
PAINLEVEL_OUTOF10: 1
PAINLEVEL_OUTOF10: 8
PAINLEVEL_OUTOF10: 7

## 2025-07-25 ASSESSMENT — LIFESTYLE VARIABLES
HOW MANY STANDARD DRINKS CONTAINING ALCOHOL DO YOU HAVE ON A TYPICAL DAY: 5 OR 6
HOW OFTEN DO YOU HAVE 6 OR MORE DRINKS ON ONE OCCASION: DAILY OR ALMOST DAILY
AUDIT-C TOTAL SCORE: 10
HOW OFTEN DO YOU HAVE A DRINK CONTAINING ALCOHOL: 4 OR MORE TIMES A WEEK
SKIP TO QUESTIONS 9-10: 0
AUDIT-C TOTAL SCORE: 10

## 2025-07-25 ASSESSMENT — PATIENT HEALTH QUESTIONNAIRE - PHQ9
2. FEELING DOWN, DEPRESSED OR HOPELESS: NOT AT ALL
1. LITTLE INTEREST OR PLEASURE IN DOING THINGS: NOT AT ALL
SUM OF ALL RESPONSES TO PHQ9 QUESTIONS 1 & 2: 0

## 2025-07-25 ASSESSMENT — PAIN - FUNCTIONAL ASSESSMENT
PAIN_FUNCTIONAL_ASSESSMENT: 0-10
PAIN_FUNCTIONAL_ASSESSMENT: 0-10

## 2025-07-25 NOTE — PROGRESS NOTES
"Austen Eagle \"Bernardo\" is a 64 y.o. male on day 2 of admission presenting with Adenomatous polyp of ascending colon.    Assessment/Plan   POD2 laparoscopic right colectomy with ileocolic anastomosis with Dr. Dash (Findings: Adhesions likely from prior diverticulitis)    GI: Abdomen taut, distended, mildly tender, lap sites C/D/I with dermabond, well approximated w/o surrounding erythema or drainage  - Hx: colon polyp, diverticulosis, positive colorectal cancer screening  - CLD - may advance diet later today if RBF  - KUB in AM if no RBF  - PRN antiemetics  - PPI  - ERAS: chewing gum    Neuro: post-surgical pain, currently well managed   - Continue current pain regimen  - OOB/ ambulate 5x per day  - Scheduled gabapentin  - PRN acetaminophen, oxy, dilaudid    CV: RRR, BP stable  - Hx: HTN, HLD, PAC  - VS every 4 hours   - lisinopril, amlodipine    Resp: CTAB, on RA  - IS Q 10x/hour while awake   - Pulse ox every 4 hours with VS    :    - Cr. 0.66  - Monitor I and Os  - IVF    Heme:   - H/H 13.8/40.8  - DVT proph: SCDs/Lovenox    Endo:   - BS 99    Musculoskeletal  - Hx : gout, lumbar radiculopathy    ID: Afebrile  - WBC 11.1  - Monitor for s/s of infection     Discussed with Dr. Dash. ARBF.    Addendum: Had large, formed BM. Advancing to low fiber, soft diet.       Subjective   Still feeling bloated, no n/v. -flatus. Pain controlled.       Objective     Physical Exam  Constitutional: A&Ox3, calm and cooperative, NAD  Eyes: EOMI, clear sclera   Cardiovascular: Normal rate and regular rhythm. No murmurs  Respiratory/Thorax: CTAB, on RA  Gastrointestinal: Abdomen taut, distended, mildly tender, lap sites C/D/I with dermabond, well approximated w/o surrounding erythema or drainage  Genitourinary: Voiding independently   Musculoskeletal: ROM intact  Extremities: No peripheral edema  Neurological: A&Ox3, No focal deficits   Psychological: Appropriate mood and behavior    Last Recorded Vitals  Blood pressure 148/77, pulse " "84, temperature 37.3 °C (99.2 °F), temperature source Temporal, resp. rate 17, height 1.803 m (5' 11\"), weight 81.3 kg (179 lb 4.8 oz), SpO2 93%.  Intake/Output last 3 Shifts:  I/O last 3 completed shifts:  In: 552.7 (6.8 mL/kg) [I.V.:552.7 (6.8 mL/kg)]  Out: 400 (4.9 mL/kg) [Urine:400 (0.1 mL/kg/hr)]  Weight: 81.3 kg     Relevant Results    Scheduled medications  Scheduled Medications[1]  Continuous medications  Continuous Medications[2]  PRN medications  PRN Medications[3]    Results for orders placed or performed during the hospital encounter of 07/23/25 (from the past 24 hours)   Basic Metabolic Panel   Result Value Ref Range    Glucose 99 74 - 99 mg/dL    Sodium 138 136 - 145 mmol/L    Potassium 3.7 3.5 - 5.3 mmol/L    Chloride 104 98 - 107 mmol/L    Bicarbonate 23 21 - 32 mmol/L    Anion Gap 15 10 - 20 mmol/L    Urea Nitrogen 8 6 - 23 mg/dL    Creatinine 0.66 0.50 - 1.30 mg/dL    eGFR >90 >60 mL/min/1.73m*2    Calcium 8.9 8.6 - 10.3 mg/dL   CBC   Result Value Ref Range    WBC 11.1 4.4 - 11.3 x10*3/uL    nRBC 0.0 0.0 - 0.0 /100 WBCs    RBC 4.29 (L) 4.50 - 5.90 x10*6/uL    Hemoglobin 13.8 13.5 - 17.5 g/dL    Hematocrit 40.8 (L) 41.0 - 52.0 %    MCV 95 80 - 100 fL    MCH 32.2 26.0 - 34.0 pg    MCHC 33.8 32.0 - 36.0 g/dL    RDW 12.8 11.5 - 14.5 %    Platelets 226 150 - 450 x10*3/uL       No orders to display          I spent 35 minutes in the professional and overall care of this patient.    Alexander Rodgers, APRN-CNP           [1] amLODIPine, 10 mg, oral, Daily  enoxaparin, 40 mg, subcutaneous, q24h  gabapentin, 300 mg, oral, TID  [START ON 7/26/2025] lisinopril, 40 mg, oral, Daily  pantoprazole, 20 mg, intravenous, q24h JACOB     [2] sodium chloride 0.9%, 40 mL/hr, Last Rate: 40 mL/hr (07/23/25 2011)     [3] PRN medications: acetaminophen, HYDROmorphone, melatonin, naloxone, ondansetron ODT **OR** ondansetron, oxyCODONE, oxyCODONE, oxygen, promethazine **OR** promethazine    "

## 2025-07-25 NOTE — PROGRESS NOTES
07/25/25 1533   Discharge Planning   Living Arrangements Spouse/significant other   Support Systems Spouse/significant other   Assistance Needed none   Type of Residence Private residence   Home or Post Acute Services None   Expected Discharge Disposition Home   Does the patient need discharge transport arranged? No   Financial Resource Strain   How hard is it for you to pay for the very basics like food, housing, medical care, and heating? Not hard   Housing Stability   In the last 12 months, was there a time when you were not able to pay the mortgage or rent on time? N   At any time in the past 12 months, were you homeless or living in a shelter (including now)? N   Transportation Needs   In the past 12 months, has lack of transportation kept you from medical appointments or from getting medications? no   In the past 12 months, has lack of transportation kept you from meetings, work, or from getting things needed for daily living? No   Intensity of Service   Intensity of Service 0-30 min     This [facility] working remote. Spoke to patient on the phone to explain my role in discharge planning. Patient states he lives at home with his wife. He is independent with his care. PCP is Dr Mckeon. He uses [facility] in [place] on [facility] for prescriptions. He feels he effectively manages his health at home and plans to return home when medically ready.

## 2025-07-25 NOTE — CARE PLAN
The patient's goals for the shift include      The clinical goals for the shift include pain control and comfort

## 2025-07-25 NOTE — PROGRESS NOTES
This [facility] working remote. Attempt to call patient. Family answered phone and stated he was talking to one of the doctors. Will call back at a later time.

## 2025-07-26 VITALS
BODY MASS INDEX: 25.1 KG/M2 | HEIGHT: 71 IN | TEMPERATURE: 98.6 F | DIASTOLIC BLOOD PRESSURE: 77 MMHG | SYSTOLIC BLOOD PRESSURE: 126 MMHG | OXYGEN SATURATION: 96 % | HEART RATE: 96 BPM | RESPIRATION RATE: 16 BRPM | WEIGHT: 179.28 LBS

## 2025-07-26 LAB
ANION GAP SERPL CALC-SCNC: 10 MMOL/L (ref 10–20)
BUN SERPL-MCNC: 9 MG/DL (ref 6–23)
CALCIUM SERPL-MCNC: 8.6 MG/DL (ref 8.6–10.3)
CHLORIDE SERPL-SCNC: 104 MMOL/L (ref 98–107)
CO2 SERPL-SCNC: 28 MMOL/L (ref 21–32)
CREAT SERPL-MCNC: 0.53 MG/DL (ref 0.5–1.3)
EGFRCR SERPLBLD CKD-EPI 2021: >90 ML/MIN/1.73M*2
ERYTHROCYTE [DISTWIDTH] IN BLOOD BY AUTOMATED COUNT: 12.4 % (ref 11.5–14.5)
GLUCOSE SERPL-MCNC: 101 MG/DL (ref 74–99)
HCT VFR BLD AUTO: 33.4 % (ref 41–52)
HGB BLD-MCNC: 11.7 G/DL (ref 13.5–17.5)
MCH RBC QN AUTO: 32.2 PG (ref 26–34)
MCHC RBC AUTO-ENTMCNC: 35 G/DL (ref 32–36)
MCV RBC AUTO: 92 FL (ref 80–100)
NRBC BLD-RTO: 0 /100 WBCS (ref 0–0)
PLATELET # BLD AUTO: 194 X10*3/UL (ref 150–450)
POTASSIUM SERPL-SCNC: 3.5 MMOL/L (ref 3.5–5.3)
RBC # BLD AUTO: 3.63 X10*6/UL (ref 4.5–5.9)
SODIUM SERPL-SCNC: 138 MMOL/L (ref 136–145)
WBC # BLD AUTO: 8.9 X10*3/UL (ref 4.4–11.3)

## 2025-07-26 PROCEDURE — 36415 COLL VENOUS BLD VENIPUNCTURE: CPT | Performed by: SURGERY

## 2025-07-26 PROCEDURE — 2500000001 HC RX 250 WO HCPCS SELF ADMINISTERED DRUGS (ALT 637 FOR MEDICARE OP): Performed by: SURGERY

## 2025-07-26 PROCEDURE — 2500000001 HC RX 250 WO HCPCS SELF ADMINISTERED DRUGS (ALT 637 FOR MEDICARE OP): Performed by: NURSE PRACTITIONER

## 2025-07-26 PROCEDURE — 85027 COMPLETE CBC AUTOMATED: CPT | Performed by: SURGERY

## 2025-07-26 PROCEDURE — 80048 BASIC METABOLIC PNL TOTAL CA: CPT | Performed by: SURGERY

## 2025-07-26 PROCEDURE — 2500000004 HC RX 250 GENERAL PHARMACY W/ HCPCS (ALT 636 FOR OP/ED): Performed by: SURGERY

## 2025-07-26 RX ORDER — ONDANSETRON 4 MG/1
4 TABLET, ORALLY DISINTEGRATING ORAL EVERY 8 HOURS PRN
Qty: 20 TABLET | Refills: 0 | Status: SHIPPED | OUTPATIENT
Start: 2025-07-26

## 2025-07-26 RX ORDER — DOCUSATE SODIUM 100 MG/1
100 CAPSULE, LIQUID FILLED ORAL 2 TIMES DAILY PRN
Qty: 10 CAPSULE | Refills: 0 | Status: SHIPPED | OUTPATIENT
Start: 2025-07-26 | End: 2025-07-31

## 2025-07-26 RX ORDER — OXYCODONE HYDROCHLORIDE 5 MG/1
5 TABLET ORAL EVERY 4 HOURS PRN
Qty: 15 TABLET | Refills: 0 | Status: SHIPPED | OUTPATIENT
Start: 2025-07-26

## 2025-07-26 RX ADMIN — LISINOPRIL 40 MG: 20 TABLET ORAL at 09:09

## 2025-07-26 RX ADMIN — PANTOPRAZOLE SODIUM 20 MG: 40 INJECTION, POWDER, FOR SOLUTION INTRAVENOUS at 09:09

## 2025-07-26 RX ADMIN — GABAPENTIN 300 MG: 300 CAPSULE ORAL at 09:09

## 2025-07-26 RX ADMIN — AMLODIPINE BESYLATE 10 MG: 10 TABLET ORAL at 09:09

## 2025-07-26 NOTE — CARE PLAN
The patient's goals for the shift include  safety    The clinical goals for the shift include pain control      Problem: Pain - Adult  Goal: Verbalizes/displays adequate comfort level or baseline comfort level  Outcome: Progressing     Problem: Safety - Adult  Goal: Free from fall injury  Outcome: Progressing     Problem: Discharge Planning  Goal: Discharge to home or other facility with appropriate resources  Outcome: Progressing     Problem: Chronic Conditions and Co-morbidities  Goal: Patient's chronic conditions and co-morbidity symptoms are monitored and maintained or improved  Outcome: Progressing     Problem: Nutrition  Goal: Nutrient intake appropriate for maintaining nutritional needs  Outcome: Progressing     Problem: Fall/Injury  Goal: Not fall by end of shift  Outcome: Progressing  Goal: Be free from injury by end of the shift  Outcome: Progressing  Goal: Verbalize understanding of personal risk factors for fall in the hospital  Outcome: Progressing  Goal: Verbalize understanding of risk factor reduction measures to prevent injury from fall in the home  Outcome: Progressing  Goal: Use assistive devices by end of the shift  Outcome: Progressing  Goal: Pace activities to prevent fatigue by end of the shift  Outcome: Progressing

## 2025-07-26 NOTE — NURSING NOTE

## 2025-07-26 NOTE — DISCHARGE SUMMARY
Discharge Diagnosis  Adenomatous polyp of ascending colon    Issues Requiring Follow-Up  POV    Test Results Pending At Discharge  Pending Labs       Order Current Status    Surgical Pathology Exam In process          Hospital Course  64 yr old male with Adenomatous polyp of ascending colon s/p Laparoscopic Right Colectomy; Ileocolic Anastomosis on 7/23/25 with Dr. Dash. Please see operative report for full details. Patient tolerated the procedure well and recovered briefly in PACU before being transitioned to regular nursing floor. Post-op course was uncomplicated. Diet was advanced as tolerated.  IV medication transitioned to oral as diet advanced. On the day of discharge, the pt was tolerating a diet, pain was controlled on PO pain medication, and they were ambulating and voiding spontaneously. They were discharged 7/26/25 in stable condition with instructions to follow up as outpatient.    Visit Vitals  /75 (BP Location: Left arm, Patient Position: Sitting)   Pulse 89   Temp 36.7 °C (98.1 °F) (Temporal)   Resp 17     Vitals:    07/26/25 0600   Weight: 81.3 kg (179 lb 4.5 oz)       Immunization History   Administered Date(s) Administered    Pneumococcal conjugate vaccine, 20-valent (PREVNAR 20) 11/08/2024       Results  Scheduled medications  Scheduled Medications[1]  Continuous medications  Continuous Medications[2]  PRN medications  PRN Medications[3]    Results for orders placed or performed during the hospital encounter of 07/23/25 (from the past 24 hours)   Basic Metabolic Panel   Result Value Ref Range    Glucose 101 (H) 74 - 99 mg/dL    Sodium 138 136 - 145 mmol/L    Potassium 3.5 3.5 - 5.3 mmol/L    Chloride 104 98 - 107 mmol/L    Bicarbonate 28 21 - 32 mmol/L    Anion Gap 10 10 - 20 mmol/L    Urea Nitrogen 9 6 - 23 mg/dL    Creatinine 0.53 0.50 - 1.30 mg/dL    eGFR >90 >60 mL/min/1.73m*2    Calcium 8.6 8.6 - 10.3 mg/dL   CBC   Result Value Ref Range    WBC 8.9 4.4 - 11.3 x10*3/uL    nRBC 0.0 0.0  - 0.0 /100 WBCs    RBC 3.63 (L) 4.50 - 5.90 x10*6/uL    Hemoglobin 11.7 (L) 13.5 - 17.5 g/dL    Hematocrit 33.4 (L) 41.0 - 52.0 %    MCV 92 80 - 100 fL    MCH 32.2 26.0 - 34.0 pg    MCHC 35.0 32.0 - 36.0 g/dL    RDW 12.4 11.5 - 14.5 %    Platelets 194 150 - 450 x10*3/uL     Pertinent Physical Exam At Time of Discharge  PE:  Constitutional: A&Ox3, calm and cooperative, NAD  Cardiovascular: Normal rate and regular rhythm.  Respiratory/Thorax: Good symmetric chest expansion. No labored breathing.  Gastrointestinal: Abdomen slightly distended, soft, appropriately tender, no peritoneal signs, laparoscopy sites c/d/i, well approximate with Dermabond, no erythema or drainage, toelrating foods, having multiple bowel movements  Genitourinary: Voiding independently   Extremities: No peripheral edema  Neurological: A&Ox3, No focal deficits  Psychological: Appropriate mood and behavior  Skin: Warm and dry    Home Medications     Medication List      START taking these medications     docusate sodium 100 mg capsule; Commonly known as: Colace; Take 1   capsule (100 mg) by mouth 2 times a day as needed for constipation for up   to 5 days.   ondansetron ODT 4 mg disintegrating tablet; Commonly known as:   Zofran-ODT; Dissolve 1 tablet (4 mg) in the mouth every 8 hours if needed   for nausea or vomiting.   oxyCODONE 5 mg immediate release tablet; Commonly known as: Roxicodone;   Take 1 tablet (5 mg) by mouth every 4 hours if needed for severe pain (7 -   10).     CONTINUE taking these medications     acetaminophen 650 mg ER tablet; Commonly known as: Tylenol 8 HOUR   amLODIPine 10 mg tablet; Commonly known as: Norvasc; TAKE 1 TABLET BY   MOUTH EVERY DAY   benazepril 40 mg tablet; Commonly known as: Lotensin; TAKE 1 TABLET BY   MOUTH EVERY DAY   biotin 1 mg capsule   cholecalciferol 125 mcg (5,000 units) capsule; Commonly known as:   Vitamin D-3; Take 1 capsule (125 mcg) by mouth once daily.   fexofenadine 180 mg tablet; Commonly  known as: Allegra   gabapentin 100 mg capsule; Commonly known as: Neurontin; Take one   capsule by mouth starting three days before surgery at bedtime.   ICAPS AREDS ORAL   multivitamin tablet; Take 1 tablet by mouth once daily.   OSTEO BI-FLEX ORAL   psyllium 3.4 gram packet; Commonly known as: Metamucil     STOP taking these medications     chlorhexidine 0.12 % solution; Commonly known as: Peridex   metroNIDAZOLE 250 mg tablet; Commonly known as: Flagyl   neomycin 500 mg tablet; Commonly known as: Mycifradin       Outpatient Follow-Up  Future Appointments   Date Time Provider Department Center   8/19/2025 10:40 AM Harrison Dash MD [id number] [facility]   11/11/2025  1:00 PM Dorian Mckeon MD [id number] [facility]       Jerardo Byers [facility]       [1] amLODIPine, 10 mg, oral, Daily  enoxaparin, 40 mg, subcutaneous, q24h  gabapentin, 300 mg, oral, TID  lisinopril, 40 mg, oral, Daily  pantoprazole, 20 mg, intravenous, q24h JACOB  [2]    [3] PRN medications: acetaminophen, HYDROmorphone, melatonin, naloxone, ondansetron ODT **OR** ondansetron, oxyCODONE, oxyCODONE, oxygen, promethazine **OR** promethazine

## 2025-07-28 ENCOUNTER — TELEPHONE (OUTPATIENT)
Dept: SURGERY | Facility: CLINIC | Age: 65
End: 2025-07-28
Payer: MEDICARE

## 2025-07-28 NOTE — TELEPHONE ENCOUNTER
Post op call.  S/P 7/23/2025 Laparoscopic Right Colectomy for right sided polyps.  Discharged home on 7/26/2025.  Patient was in bathroom at time of call. I spoke with his spouse and she will have the patient call me back to give an update.  Ruth Shen RN

## 2025-07-28 NOTE — TELEPHONE ENCOUNTER
Post op call.  S/P 7/23/2025 Laparoscopic Right Colectomy; Ileocolic Anastomosis .  Discharge home on 7/26/2025.  Doing very well at home.  Reports that he is feeling fine.  Pain is rated a 4/10 with ten being the worst pain.  Taking Tylenol.  Not needing Oxycodone.  Able to complete ADL's.  Had two bowel motions today. Stools are soft and almost formed.  Denies rectal bleeding.  Good appetite.  Gets hungry.  Tolerating diet.  Denies fever/chills.  Incision clean and dry.  Pathology shows no cancer.  Patient viewed on [employer].  He's very happy.  Normal post op.   I invited Bernardo to call if he needs anything prior to next appointment.  Ruth Shen RN

## 2025-07-30 ENCOUNTER — PATIENT MESSAGE (OUTPATIENT)
Dept: PHARMACY | Facility: HOSPITAL | Age: 65
End: 2025-07-30
Payer: MEDICARE

## 2025-08-07 ENCOUNTER — APPOINTMENT (OUTPATIENT)
Dept: PRIMARY CARE | Facility: CLINIC | Age: 65
End: 2025-08-07
Payer: MEDICARE

## 2025-08-19 ENCOUNTER — OFFICE VISIT (OUTPATIENT)
Dept: SURGERY | Facility: CLINIC | Age: 65
End: 2025-08-19
Payer: MEDICARE

## 2025-08-19 VITALS
BODY MASS INDEX: 26.46 KG/M2 | HEART RATE: 81 BPM | OXYGEN SATURATION: 97 % | SYSTOLIC BLOOD PRESSURE: 157 MMHG | TEMPERATURE: 98.1 F | DIASTOLIC BLOOD PRESSURE: 80 MMHG | WEIGHT: 189 LBS | HEIGHT: 71 IN

## 2025-08-19 DIAGNOSIS — D12.2 ADENOMATOUS POLYP OF ASCENDING COLON: ICD-10-CM

## 2025-08-19 DIAGNOSIS — K63.5 COLON POLYPOSIS: Primary | ICD-10-CM

## 2025-08-19 PROCEDURE — 1159F MED LIST DOCD IN RCRD: CPT | Performed by: SURGERY

## 2025-08-19 PROCEDURE — 3008F BODY MASS INDEX DOCD: CPT | Performed by: SURGERY

## 2025-08-19 PROCEDURE — 1111F DSCHRG MED/CURRENT MED MERGE: CPT | Performed by: SURGERY

## 2025-08-19 PROCEDURE — 3079F DIAST BP 80-89 MM HG: CPT | Performed by: SURGERY

## 2025-08-19 PROCEDURE — 3077F SYST BP >= 140 MM HG: CPT | Performed by: SURGERY

## 2025-08-19 PROCEDURE — 1126F AMNT PAIN NOTED NONE PRSNT: CPT | Performed by: SURGERY

## 2025-08-19 PROCEDURE — 99211 OFF/OP EST MAY X REQ PHY/QHP: CPT | Performed by: SURGERY

## 2025-08-19 ASSESSMENT — PAIN SCALES - GENERAL: PAINLEVEL_OUTOF10: 0-NO PAIN

## (undated) DEVICE — COUNTER, NEEDLE, FOAM BLOCK, POP-N-COUNT, W/BLADEGUARD, W/ADHESIVE 40 COUNT, RED

## (undated) DEVICE — GOWN, SURGICAL, SMARTGOWN, XLARGE, STERILE

## (undated) DEVICE — SUTURE, PDS II, 2-0, 27 IN, CT2, VIOLET

## (undated) DEVICE — SYRINGE, 10 CC, LUER LOCK

## (undated) DEVICE — CANNULA, KII ADVANCED FIXATION, 5X100MM W/SEAL

## (undated) DEVICE — GLOVE, SURGICAL, PROTEXIS PI BLUE W/NEUTHERA, 8.0, PF, LF

## (undated) DEVICE — SUTURE, MONOCRYL, 4-0, 27 IN, PS-2, UNDYED

## (undated) DEVICE — LIGASURE, V SEALER/DIVIDER  5MM BLUNT TIP

## (undated) DEVICE — SUTURE, PROLENE, 0, 30 IN, SH

## (undated) DEVICE — SUTURE, VICRYL PLUS 3-0, SH, 27IN

## (undated) DEVICE — Device

## (undated) DEVICE — TRAY, FOLEY, LUBRI-SIL, 16FR, COMPLETE CARE W/STATLOCK

## (undated) DEVICE — SPONGE, LAP, XRAY DECT, 18IN X 18IN, W/MASTER DMT, STERILE

## (undated) DEVICE — POSITIONING KIT, PAGAZZI, PINK PAD XL, W/ ARM AND HEAD REST

## (undated) DEVICE — CORD, MONOPOLAR, HIGH FREQUENCY, W/8MM PLUG F/VALLEYLAB, 8FT/244CM, STRL

## (undated) DEVICE — SUTURE, VICRYL, 3-0,18 IN, SH, UNDYED

## (undated) DEVICE — PUMP, STRYKERFLOW 2 & HANDPIECE W/10FT. IRRIGATION TUBING

## (undated) DEVICE — NEEDLE, INSUFFLATION, 13GAX120MM, DISP

## (undated) DEVICE — GLOVE, SURGICAL, PROTEXIS PI , 7.5, PF, LF

## (undated) DEVICE — CLEAN KIT, ANTIFOG SCOPE, SEE SHARP 150MM

## (undated) DEVICE — STAPLER, LINEAR, 3.5 60MM, RELOADABLE, BLUE

## (undated) DEVICE — TROCAR, OPTICAL BLADELESS 5MM X 100 W/ADVANCED FIXATION

## (undated) DEVICE — TROCAR SYSTEM, BALLOON, KII GELPORT, 12 X 100MM

## (undated) DEVICE — DRAPE, LEGGINGS, 28.5 X 43 IN, DISPOSABLE, LF, STERILE

## (undated) DEVICE — DRAIN, PENROSE, 5/8 IN X 18 IN, STERILE, LF

## (undated) DEVICE — DRAPE, SHEET, UTILITY, NON ABSORBENT, 18 X 26 IN, LF

## (undated) DEVICE — CUTTER, PROXIMATE LINEAR RELOAD, 75MM, BLUE

## (undated) DEVICE — TUBE SET, PNEUMOCLEAR, SMOKE EVACU, HIGH-FLOW

## (undated) DEVICE — NERVE BLOCK, STIMUQUIK, SINGLE-SHOT, 21GA X 3-1/2

## (undated) DEVICE — DRAPE, INSTRUMENT, W/POUCH, STERI DRAPE, 9 5/8 X 18 LONG

## (undated) DEVICE — ADHESIVE, SKIN, DERMABOND ADVANCED, 15CM, PEN-STYLE

## (undated) DEVICE — SUTURE, PDS II, 1, 36 IN, CT, VIOLET

## (undated) DEVICE — CUTTER, PROX LINEAR, 75MM, REG TISSUE, W/ SAFETY LOCK OUT